# Patient Record
Sex: FEMALE | Race: WHITE | NOT HISPANIC OR LATINO | Employment: STUDENT | URBAN - METROPOLITAN AREA
[De-identification: names, ages, dates, MRNs, and addresses within clinical notes are randomized per-mention and may not be internally consistent; named-entity substitution may affect disease eponyms.]

---

## 2017-06-28 ENCOUNTER — PATIENT MESSAGE (OUTPATIENT)
Dept: PSYCHIATRY | Facility: CLINIC | Age: 22
End: 2017-06-28

## 2019-05-27 ENCOUNTER — TELEPHONE (OUTPATIENT)
Dept: SPINE | Facility: CLINIC | Age: 24
End: 2019-05-27

## 2019-05-27 NOTE — TELEPHONE ENCOUNTER
Spoke with patient and scheduled her an appointment to see Sharmin Harris 5-29-19, she indicated understanding.

## 2019-05-27 NOTE — TELEPHONE ENCOUNTER
----- Message from Alissa Conte sent at 5/27/2019  1:23 PM CDT -----   Pt  Is calling to  Reschedule  Harley , pt   Was offed  June 14 and stated the  Office stated they  Have  Sooner // please call [phone]

## 2019-05-29 ENCOUNTER — OFFICE VISIT (OUTPATIENT)
Dept: SPINE | Facility: CLINIC | Age: 24
End: 2019-05-29
Payer: COMMERCIAL

## 2019-05-29 VITALS
BODY MASS INDEX: 26.71 KG/M2 | SYSTOLIC BLOOD PRESSURE: 118 MMHG | HEART RATE: 110 BPM | DIASTOLIC BLOOD PRESSURE: 77 MMHG | HEIGHT: 64 IN | WEIGHT: 156.44 LBS

## 2019-05-29 DIAGNOSIS — M54.2 CERVICALGIA: Primary | ICD-10-CM

## 2019-05-29 DIAGNOSIS — G89.29 CHRONIC MIDLINE THORACIC BACK PAIN: ICD-10-CM

## 2019-05-29 DIAGNOSIS — G89.29 CHRONIC MIDLINE LOW BACK PAIN WITHOUT SCIATICA: ICD-10-CM

## 2019-05-29 DIAGNOSIS — M54.6 CHRONIC MIDLINE THORACIC BACK PAIN: ICD-10-CM

## 2019-05-29 DIAGNOSIS — M54.50 CHRONIC MIDLINE LOW BACK PAIN WITHOUT SCIATICA: ICD-10-CM

## 2019-05-29 DIAGNOSIS — Z87.39 HISTORY OF SCOLIOSIS: ICD-10-CM

## 2019-05-29 PROCEDURE — 99999 PR PBB SHADOW E&M-EST. PATIENT-LVL III: ICD-10-PCS | Mod: PBBFAC,,, | Performed by: PHYSICIAN ASSISTANT

## 2019-05-29 PROCEDURE — 99203 OFFICE O/P NEW LOW 30 MIN: CPT | Mod: S$GLB,,, | Performed by: PHYSICIAN ASSISTANT

## 2019-05-29 PROCEDURE — 99203 PR OFFICE/OUTPT VISIT, NEW, LEVL III, 30-44 MIN: ICD-10-PCS | Mod: S$GLB,,, | Performed by: PHYSICIAN ASSISTANT

## 2019-05-29 PROCEDURE — 99999 PR PBB SHADOW E&M-EST. PATIENT-LVL III: CPT | Mod: PBBFAC,,, | Performed by: PHYSICIAN ASSISTANT

## 2019-05-29 NOTE — PROGRESS NOTES
Neurosurgery History & Physical    Patient ID: Mirella Kline is a 23 y.o. female.    Chief Complaint   Patient presents with    Back Pain     She has had back and neck pain for years. Pain is constant and radiates down between her shoulder blades. She has scoliosis. Cracking her back makes the pain better. Sitting, standing, or lying down makes the pain worse. The pain is worse at night.    Neck Pain       Review of Systems   Constitutional: Negative for activity change, appetite change, chills, fever and unexpected weight change.   HENT: Negative for tinnitus, trouble swallowing and voice change.    Respiratory: Negative for apnea, cough, chest tightness and shortness of breath.    Cardiovascular: Negative for chest pain and palpitations.   Gastrointestinal: Negative for constipation, diarrhea, nausea and vomiting.   Genitourinary: Negative for difficulty urinating, dysuria, frequency and urgency.   Musculoskeletal: Positive for back pain, myalgias and neck pain. Negative for gait problem and neck stiffness.   Skin: Negative for wound.   Neurological: Negative for dizziness, tremors, seizures, facial asymmetry, speech difficulty, weakness, light-headedness, numbness and headaches.   Psychiatric/Behavioral: Negative for confusion and decreased concentration.       Past Medical History:   Diagnosis Date    History of psychiatric care     History of psychiatric hospitalization     Self-harming behavior     Suicide attempt      Social History     Socioeconomic History    Marital status: Single     Spouse name: Not on file    Number of children: Not on file    Years of education: Not on file    Highest education level: Not on file   Occupational History    Not on file   Social Needs    Financial resource strain: Not on file    Food insecurity:     Worry: Not on file     Inability: Not on file    Transportation needs:     Medical: Not on file     Non-medical: Not on file   Tobacco Use    Smoking  status: Current Some Day Smoker   Substance and Sexual Activity    Alcohol use: Yes    Drug use: Not on file    Sexual activity: Not on file   Lifestyle    Physical activity:     Days per week: Not on file     Minutes per session: Not on file    Stress: Not on file   Relationships    Social connections:     Talks on phone: Not on file     Gets together: Not on file     Attends Church service: Not on file     Active member of club or organization: Not on file     Attends meetings of clubs or organizations: Not on file     Relationship status: Not on file   Other Topics Concern    Caffeine Use: Excessive Not Asked    Financial Status: Unemployed Not Asked    Legal: Other Not Asked    Childhood History: Adopted Not Asked    Financial Status: Other Not Asked    Leisure: Exercise Not Asked    Childhood History: Early trauma Not Asked    Firearms: Does patient have access to a firearm? Not Asked    Leisure: Fishing Not Asked    Childhood History: Raised by parents Yes    Home situation: homeless Not Asked    Leisure: Hunting Not Asked    Childhood History: Uneventful Not Asked    Home situation: lives alone Not Asked    Leisure: Movie Watching Not Asked    Childhood History: Other Not Asked    Home situation: lives in group home Not Asked    Leisure: Shopping Not Asked    Education: Unfinished High School Not Asked    Home situation: lives in nursing home Not Asked    Leisure: Sports Not Asked    Education: High School Graduate Yes    Home situation: lives in shelter Not Asked    Leisure: Time with family Not Asked    Education: Unfinished college Not Asked    Home situation: lives with family Not Asked     Service Not Asked    Education: Trade School Not Asked    Home situation: lives with friends Not Asked    Spirituality: Active Participation Not Asked    Education: Associate's Degree Not Asked    Home situation: lives with significant other Not Asked    Spirituality:  "Organized Sabianism Not Asked    Education: Bachelor's Degree Not Asked    Home situation: lives with spouse Not Asked    Spirituality: Private Participation Not Asked    Education: More than one Bachelor's or Professional Not Asked    Legal consequences of chemical use Not Asked    Patient feels they ought to cut down on drinking/drug use Not Asked    Education: Master's, PhD Not Asked    Legal: Arrest history Not Asked    Patient annoyed by others criticizing their drinking/drug use Not Asked    Financial Status: Disabled Not Asked    Legal: Involved in civil litigation Not Asked    Patient has felt bad or guilty about drinking/drug use Not Asked    Financial Status: Employed Not Asked    Legal: Involved in criminal litigation Not Asked    Patient has had a drink/used drugs as an eye opener in the AM Not Asked   Social History Narrative    Not on file     Family History   Problem Relation Age of Onset    Bipolar disorder Neg Hx     Schizophrenia Neg Hx      Review of patient's allergies indicates:  No Known Allergies    Current Outpatient Medications:     aripiprazole (ABILIFY) 5 MG Tab, 1.5 pills by mouth daily, Disp: 45 tablet, Rfl: 1    lamotrigine (LAMICTAL) 200 MG tablet, Take 1 tablet (200 mg total) by mouth once daily., Disp: 30 tablet, Rfl: 2    lorazepam (ATIVAN) 0.5 MG tablet, Take 1 tablet (0.5 mg total) by mouth every 6 (six) hours as needed for Anxiety (or severe agitation)., Disp: 20 tablet, Rfl: 1    Vitals:    05/29/19 1322   BP: 118/77   BP Location: Right arm   Patient Position: Sitting   BP Method: Medium (Automatic)   Pulse: 110   Weight: 70.9 kg (156 lb 6.7 oz)   Height: 5' 4" (1.626 m)       Physical Exam   Constitutional: She is oriented to person, place, and time. She appears well-developed and well-nourished.   HENT:   Head: Normocephalic and atraumatic.   Eyes: Pupils are equal, round, and reactive to light.   Neck: Normal range of motion. Neck supple. "   Cardiovascular: Normal rate.   Pulmonary/Chest: Effort normal.   Musculoskeletal: Normal range of motion. She exhibits no edema.   Neurological: She is alert and oriented to person, place, and time. She has a normal Finger-Nose-Finger Test, a normal Heel to Shin Test, a normal Romberg Test and a normal Tandem Gait Test. Gait normal.   Reflex Scores:       Tricep reflexes are 2+ on the right side and 2+ on the left side.       Bicep reflexes are 2+ on the right side and 2+ on the left side.       Brachioradialis reflexes are 2+ on the right side and 2+ on the left side.       Patellar reflexes are 2+ on the right side and 2+ on the left side.       Achilles reflexes are 2+ on the right side and 2+ on the left side.  Skin: Skin is warm, dry and intact.   Psychiatric: She has a normal mood and affect. Her speech is normal and behavior is normal. Judgment and thought content normal.   Nursing note and vitals reviewed.      Neurologic Exam     Mental Status   Oriented to person, place, and time.   Oriented to person.   Oriented to place.   Oriented to time.   Follows 3 step commands.   Attention: normal. Concentration: normal.   Speech: speech is normal   Level of consciousness: alert  Knowledge: consistent with education.   Able to name object. Able to read. Able to repeat. Able to write. Normal comprehension.     Cranial Nerves     CN II   Visual acuity: normal  Right visual field deficit: none  Left visual field deficit: none     CN III, IV, VI   Pupils are equal, round, and reactive to light.  Right pupil: Size: 3 mm. Shape: regular. Reactivity: brisk. Consensual response: intact.   Left pupil: Size: 3 mm. Shape: regular. Reactivity: brisk. Consensual response: intact.   CN III: no CN III palsy  CN VI: no CN VI palsy  Nystagmus: none   Diplopia: none  Ophthalmoparesis: none  Conjugate gaze: present    CN V   Right facial sensation deficit: none  Left facial sensation deficit: none    CN VII   Right facial weakness:  none  Left facial weakness: none    CN VIII   Hearing: intact    CN IX, X   CN IX normal.   CN X normal.     CN XI   Right sternocleidomastoid strength: normal  Left sternocleidomastoid strength: normal  Right trapezius strength: normal  Left trapezius strength: normal    CN XII   Fasciculations: absent  Tongue deviation: none    Motor Exam   Muscle bulk: normal  Overall muscle tone: normal  Right arm pronator drift: absent  Left arm pronator drift: absent    Strength   Right neck flexion: 5/5  Left neck flexion: 5/5  Right neck extension: 5/5  Left neck extension: 5/5  Right deltoid: 5/5  Left deltoid: 5/5  Right biceps: 5/5  Left biceps: 5/5  Right triceps: 5/5  Left triceps: 5/5  Right wrist flexion: 5/5  Left wrist flexion: 5/5  Right wrist extension: 5/5  Left wrist extension: 5/5  Right interossei: 5/5  Left interossei: 5/5  Right abdominals: 5/5  Left abdominals: 5/5  Right iliopsoas: 5/5  Left iliopsoas: 5/5  Right quadriceps: 5/5  Left quadriceps: 5/5  Right hamstrin/5  Left hamstrin/5  Right glutei: 5/5  Left glutei: 5/5  Right anterior tibial: 5/5  Left anterior tibial: 5/5  Right posterior tibial: 5/5  Left posterior tibial: 5/5  Right peroneal: 5/5  Left peroneal: 5/5  Right gastroc: 5/5  Left gastroc: 5/5    Sensory Exam   Right arm light touch: normal  Left arm light touch: normal  Right leg light touch: normal  Left leg light touch: normal  Right arm vibration: normal  Left arm vibration: normal  Right arm pinprick: normal  Left arm pinprick: normal    Gait, Coordination, and Reflexes     Gait  Gait: normal    Coordination   Romberg: negative  Finger to nose coordination: normal  Heel to shin coordination: normal  Tandem walking coordination: normal    Tremor   Resting tremor: absent  Intention tremor: absent  Action tremor: absent    Reflexes   Right brachioradialis: 2+  Left brachioradialis: 2+  Right biceps: 2+  Left biceps: 2+  Right triceps: 2+  Left triceps: 2+  Right patellar: 2+  Left  patellar: 2+  Right achilles: 2+  Left achilles: 2+  Right Farrell: absent  Left Farrell: absent  Right ankle clonus: absent  Left ankle clonus: absent      Provider dictation:  23 year old female smoker with history of scoliosis, anxiety, bipolar disorder and depression is self referred for evaluation of scapula, neck and back pain.  She has had pain for years focused in the interscapular region with radiation along the axis of the spine to the lower back.  She denies radicular leg pain, numbness and tinging.  Pain increases with sitting, standing and laying down.  She ha stried advil for pain control.  She has not had PT or YULIA.  NDI:  44%.  Oswestry score: 28%.  PHQ:  9.    She is neurologically intact with 2+ muscle stretch reflexes, 5/5 strength and no sensory deficits int he upper / lower extremities.  There is winging of the right scapula.  Pain improves with both flexixon and extension of the lower back.    She has not had any imaging.    In conclusion, Ms. Ceron is a 23 year old female with chronic axial mechanical back pain and hisotry of scoliosis.  She has no radiculopathy.  We have discussed the importance of posture and regular exercise.  I am referring her to PT for improvement of strength and flexibility.  I recommend scoliosis xrays and will call with results once completed.  Follow up in 8 weeks to monitor progress with PT.    Visit Diagnosis:  There are no diagnoses linked to this encounter.    Total time spent counseling greater than fifty percent of total visit time.  Counseling included discussion regarding imaging findings, diagnosis possibilities, treatment options, risks and benefits.   The patient had many questions regarding the options and long-term effects.

## 2019-06-07 ENCOUNTER — CLINICAL SUPPORT (OUTPATIENT)
Dept: REHABILITATION | Facility: HOSPITAL | Age: 24
End: 2019-06-07
Payer: COMMERCIAL

## 2019-06-07 DIAGNOSIS — G89.29 CHRONIC BILATERAL LOW BACK PAIN WITHOUT SCIATICA: ICD-10-CM

## 2019-06-07 DIAGNOSIS — M54.50 CHRONIC BILATERAL LOW BACK PAIN WITHOUT SCIATICA: ICD-10-CM

## 2019-06-07 DIAGNOSIS — M54.2 NECK PAIN: ICD-10-CM

## 2019-06-07 PROCEDURE — 97161 PT EVAL LOW COMPLEX 20 MIN: CPT | Mod: PO | Performed by: PHYSICAL THERAPIST

## 2019-06-07 NOTE — PATIENT INSTRUCTIONS
[email or web address].[email or web address]/wp-content/[email or web address]-posture-p.[email or web address]    Sleeping on Back        Place pillow under knees. A pillow with cervical support and a roll around waist are also helpful.      Copyright © I. All rights reserved.        Sleeping on Side        Place pillow between knees. Use cervical support under neck and a roll around waist as needed.      Copyright © I. All rights reserved.        Posture - Standing        Good posture is important. Avoid slouching and forward head thrust. Maintain curve in low back and align ears over shoul- ders, hips over ankles.      Copyright © [employer]. All rights reserved.        Posture - Sitting        Sit upright, head facing forward. Try using a roll to support lower back. Keep shoulders relaxed, and avoid rounded back. Keep hips level with knees. Avoid crossing legs for long periods.      Copyright © [employer]. All rights reserved.        Reading        When reading, hold material in tilted position and maintain good sitting posture.      Copyright © [employer]. All rights reserved.          Work Positioning        Position self close to work, whether standing or sitting. Avoid straining forward at neck or waist.      Copyright © [employer]. All rights reserved.       Work Height and Reach        Ideal work height is no more than 2 to 4 inches below elbow level when standing, and at elbow level when sitting. Reaching should be limited to arm's length, with elbows slightly bent.      Copyright © [employer]. All rights reserved.

## 2019-06-07 NOTE — PLAN OF CARE
[facility]  Physical Therapy Initial Evaluation    Name: Mirella Kline  Clinic Number: [id number]    Therapy Diagnosis:   Encounter Diagnoses   Name Primary?    Chronic bilateral low back pain without sciatica     Neck pain      Physician: Sharmin Harris PA-C    Physician Orders: PT Eval and Treat     Medical Diagnosis from Referral:   Cervicalgia   Chronic midline thoracic back pain   Chronic midline low back pain without sciatica   History of scoliosis     Evaluation Date: 6/7/2019  Authorization Period Expiration: 12/31/2019  Plan of Care Expiration: 08/02/2019  Visit # / Visits authorized: 1/ 20    Time In: 1100  Time Out: 1200  Total Billable Time: 60 minutes    Precautions: Standard    Subjective   Date of onset: 5/29/2019  History of current condition - Mirella reports: having neck and low back pain in seated position and sometimes in standing position. No falls noted. No radicular pain. Patient reported no numbness/tingling noted. No radicular pain.  Red Flags  Patient reports no history of trauma including MVA/fall; past or present cancer; fever, chills, night sweats; unexplained weight change in past 3 months; recent infection; ongoing/persistent night pain; change in bowel/bladder, and saddle anesthesia.      Past Medical History:   Diagnosis Date    History of psychiatric care     History of psychiatric hospitalization     Self-harming behavior     Suicide attempt      Mirella Kline  has no past surgical history on file.    Mirella has a current medication list which includes the following prescription(s): aripiprazole, lamotrigine, and lorazepam.    Review of patient's allergies indicates:  No Known Allergies     Imaging: none at this time.    Prior Therapy: none noted  Social History:  lives with their family  Occupation: working part time at [employer]. Will be starting college in the fall at [employer].  Prior Level of Function: independent  Current Level  of Function: modified independent, increase time noted in seated position  Recent or major surgery: none noted  Accidents: none noted    Pain:  Current 2/10, worst 5/10, best 0/10   Location: bilateral low back, mid back, lower cervical   Description: Aching, Dull and Variable  Aggravating Factors: Sitting, Standing and Getting out of bed/chair  Easing Factors: standing, extension     Night pain: none    Pts goals: Decrease pain to low back and neck. Work on posture.    Objective   Posture: FHP, rounded shoulders, slump posture as well, winging of the scapula.  Palpation: mild tenderness to bilateral lumbar paraspinals.    Sensation: Dermatomes:   Right Left Comment   C2/C3 (posterior head/neck) intact intact    C4 intact intact    C5 intact intact    C6 intact intact    C7 intact intact    C8 intact intact    T1 intact intact      DTR:   Right Left Comment   Biceps (C5-6) 2+ 2+    Triceps (C7) 2+ 2+                                                                                                                                                                                                                                    Cervical ROM: Inclinometer/Goniometer                            Pain/dysfunction/movement  Flexion 45 Can't touch sternum to chin. Non-uniform curvature   Extension 50 Non-uniform curvature. Not within 10 degrees of parallel of the horizontal line   Side-bending Right 38    Side-bending Left 45    Right rotation 61 Chin/nose not in line with mid-clavicle. +/- 80 degrees   Left rotation 68 Chin/nose not in line with mid-clavicle. +/- 80 degrees     OA -  AA -     Upper Extremity Strength  (R) UE  (L) UE    C4 Upper trap 5/5 C4 Upper trap 5/5   C5 Deltoid(90 ABD): 5/5 C5 Deltoid (90 ABD): 5/5   C6 Biceps/ECRB/L 5/5 C6 Biceps/ECRB/L 5/5   C7 triceps/FCR 4+/5 C7 triceps/FCR 5/5   C8 Abd.pollicis reese 5/5 C8 Abd. Pollicis reese. 5/5   T1 First Dorsal inter 5/5 T1 First Dorsal inter 5/5       Special  "Tests:  Distraction -   Compression -   Spurlings -   MVA/trauma  Sharp-Jacinta   Alar integrity -  -  -   VA test   -     Lateral Flexion Alar Ligament -   DNF test   -     UMN:   Farrell: -  Lhermitt: -   FRT (38-45 degrees norm) -      Upper Limb Neurodynamic testing:  ULTT: (Check Median). Good screen for radiculopathy -    Palpation: point tender to UT    Flexibility: pec minor    Sensation: Dermatomes     Right Left Comment   L2 (lateral thigh) intact intact    L3 (medial thigh) intact intact    L4 (medial calf) intact intact    L5 (lateral calf) intact intact    S1 (lateral foot) intact intact    S2 (gastro/HS) intact intact    Saddle (cauda equina) intact intact      Myotomes:   Right Left Comment   Hip flexion (L2-3): 4+/5 5/5    Knee extension (L3-4): 5/5 5/5    DF (L4-5): 5/5 5/5    Great Toe Ext (L5-S1):   5/5 5/5    Glut Medius (L5) 4-/5 4/5    Great Toe Flex/HS (S1-S2) 5/5 5/5      DTR:   Right Left Comment   Patellar (L3-4) 2+ 2+    Achilles (S1) 2+ 2+        Thoracic/Lumbar AROM:     % limitation Pain/Dysfunction/movement   Flexion  (55-60 N)    25% Cannot touch toes  Non-uniform curvature     Extension (25 N)  25% UE does not maintain 170  ASIS does not clear toes  Lack of posterior weight shift     Special Tests:  -Repeated Flexion: no affect  -Repeated Ext: no affect    Static position:  Prolong seated position: low back pain noted    Bridge test/Endurance: (mean= 76.7 " compared to no low back pain, 172.9")     Lumbar:   SLR (with leg dominant pain)-  Slump-  Clonus-  Babinski-    UMN testing:  Cross over sign: -    GAIT: Mirella ambulates 100 feet with no assistive device with independently.     Transfers: independent    Pt/family was provided educational information, including: role of PT, goals for PT, scheduling - pt verbalized understanding. Discussed insurance limitations with pt.       CMS Impairment/Limitation/Restriction for FOTO Lumbar Spine Survey  Status Limitation G-Code CMS " Severity Modifier  Intake 67% 33% Current Status CJ - At least 20 percent but less than 40 percent  Predicted 74% 26% Goal Status+ CJ - At least 20 percent but less than 40 percent       TREATMENT   Treatment Time In: 1100  Treatment Time Out: 1200  Total Treatment time separate from Evaluation: 60 minutes    Mirella received therapeutic exercises to develop strength, endurance, ROM, flexibility, posture and core stabilization for 10 minutes including:  Standing scapular retraction with ER, yellow band  Cervical retraction  EIS  UT stretch    Home Exercises and Patient Education Provided    Education provided:   - Yes    Written Home Exercises Provided: yes.  Exercises were reviewed and Mirella was able to demonstrate them prior to the end of the session.  Mirella demonstrated good  understanding of the education provided.     See EMR under Patient Instructions for exercises provided 6/7/2019.    Assessment   Mirella is a 23 y.o. female referred to outpatient Physical Therapy with a medical diagnosis of Cervicalgia   Chronic midline thoracic back pain   Chronic midline low back pain without sciatica   History of scoliosis. Pt presents with postural imbalance, decreased lumbar and cervical mobility, neck pain, low back pain, decreased hip/core strength.    Problem List: pain, decreased ROM, decreased flexibility, decreased strength, decreased balance and stability, decreased motor control, antalgic gait and inability to participate fully in vocation pursuits.    Pt prognosis is Good.   Pt will benefit from skilled outpatient Physical Therapy to address the deficits stated above and in the chart below, provide pt/family education, and to maximize pt's level of independence.     Plan of care discussed with patient: Yes  Pt's spiritual, cultural and educational needs considered and patient is agreeable to the plan of care and goals as stated below:     Anticipated Barriers for therapy: none    Medical Necessity  is demonstrated by the following  History  Co-morbidities and personal factors that may impact the plan of care Co-morbidities:   anxiety    Personal Factors:   no deficits     moderate   Examination  Body Structures and Functions, activity limitations and participation restrictions that may impact the plan of care Body Regions:   neck  back  lower extremities  upper extremities  trunk    Body Systems:    gross symmetry  ROM  strength  balance  gait  transfers  transitions  motor control    Participation Restrictions:   Community ambulation  Seated work    Activity limitations:   Learning and applying knowledge  no deficits    General Tasks and Commands  no deficits    Communication  no deficits    Mobility  lifting and carrying objects  walking    Self care  no deficits    Domestic Life  doing house work (cleaning house, washing dishes, laundry)    Interactions/Relationships  no deficits    Life Areas  no deficits    Community and Social Life  no deficits         high   Clinical Presentation stable and uncomplicated low   Decision Making/ Complexity Score: low     Short Term GOALS:  In 4 weeks, pt. will:  - improve hip/core MMT 1/2 grade for ADL purposes.  - decrease outcome measure limitation to <30%  - Pt will demonstrate increased lumbar ROM by 25% from the initial ROM value with improvements noted in functional ROM and ability to perform ADLs.  - Patient report a reduction in worst pain score by 1-2 points for improved tolerance for seated work..  - demonstrate cervical rotation > 65 degrees for driving/mobility purposes.    Long Term GOALS:  In 8 weeks, pt. will:  - be independent and compliant with HEP and SX management   - decrease outcome measure limitation to <20%  Pt to demonstrate ability to independently control and reduce their pain through posture positioning and mechanical movements throughout a typical day.      Plan   Plan of care Certification: 6/7/2019 to 08/02/2019.  Outpatient Physical Therapy  2 times weekly for 8 weeks to include the following interventions: Manual Therapy, Moist Heat/ Ice, Neuromuscular Re-ed, Patient Education, Therapeutic Activites and Therapeutic Exercise.      Mirella may at times be seen by a PTA as part of the Rehab Team.    Randal Cisneros, PT

## 2020-10-05 PROBLEM — Z34.90 ENCOUNTER FOR INDUCTION OF LABOR: Status: ACTIVE | Noted: 2020-10-05

## 2020-10-05 PROBLEM — Z34.90 TERM PREGNANCY: Status: ACTIVE | Noted: 2020-10-05

## 2021-05-10 ENCOUNTER — PATIENT MESSAGE (OUTPATIENT)
Dept: RESEARCH | Facility: HOSPITAL | Age: 26
End: 2021-05-10

## 2021-06-13 ENCOUNTER — OFFICE VISIT (OUTPATIENT)
Dept: URGENT CARE | Facility: CLINIC | Age: 26
End: 2021-06-13
Payer: COMMERCIAL

## 2021-06-13 VITALS
RESPIRATION RATE: 16 BRPM | DIASTOLIC BLOOD PRESSURE: 79 MMHG | BODY MASS INDEX: 33.97 KG/M2 | HEIGHT: 64 IN | OXYGEN SATURATION: 98 % | TEMPERATURE: 97 F | HEART RATE: 86 BPM | SYSTOLIC BLOOD PRESSURE: 120 MMHG | WEIGHT: 199 LBS

## 2021-06-13 DIAGNOSIS — J06.9 VIRAL URI WITH COUGH: Primary | ICD-10-CM

## 2021-06-13 PROCEDURE — 99203 PR OFFICE/OUTPT VISIT, NEW, LEVL III, 30-44 MIN: ICD-10-PCS | Mod: S$GLB,,, | Performed by: PHYSICIAN ASSISTANT

## 2021-06-13 PROCEDURE — 99203 OFFICE O/P NEW LOW 30 MIN: CPT | Mod: S$GLB,,, | Performed by: PHYSICIAN ASSISTANT

## 2021-06-13 RX ORDER — AZELASTINE 1 MG/ML
1 SPRAY, METERED NASAL 2 TIMES DAILY
Qty: 30 ML | Refills: 0 | Status: SHIPPED | OUTPATIENT
Start: 2021-06-13 | End: 2021-09-01

## 2021-06-13 RX ORDER — BENZONATATE 200 MG/1
200 CAPSULE ORAL 3 TIMES DAILY PRN
Qty: 30 CAPSULE | Refills: 0 | Status: SHIPPED | OUTPATIENT
Start: 2021-06-13 | End: 2021-06-23

## 2021-09-01 ENCOUNTER — OFFICE VISIT (OUTPATIENT)
Dept: FAMILY MEDICINE | Facility: CLINIC | Age: 26
End: 2021-09-01
Payer: COMMERCIAL

## 2021-09-01 VITALS — BODY MASS INDEX: 25.61 KG/M2 | WEIGHT: 150 LBS | HEIGHT: 64 IN

## 2021-09-01 DIAGNOSIS — R05.9 COUGH: ICD-10-CM

## 2021-09-01 DIAGNOSIS — J02.9 SORE THROAT: ICD-10-CM

## 2021-09-01 DIAGNOSIS — H66.90 ACUTE OTITIS MEDIA, UNSPECIFIED OTITIS MEDIA TYPE: Primary | ICD-10-CM

## 2021-09-01 LAB
CTP QC/QA: YES
SARS-COV-2 RDRP RESP QL NAA+PROBE: NEGATIVE

## 2021-09-01 PROCEDURE — 99203 OFFICE O/P NEW LOW 30 MIN: CPT | Mod: 95,,, | Performed by: INTERNAL MEDICINE

## 2021-09-01 PROCEDURE — 99203 PR OFFICE/OUTPT VISIT, NEW, LEVL III, 30-44 MIN: ICD-10-PCS | Mod: 95,,, | Performed by: INTERNAL MEDICINE

## 2021-09-01 RX ORDER — AMOXICILLIN 500 MG/1
500 CAPSULE ORAL EVERY 12 HOURS
Qty: 20 CAPSULE | Refills: 0 | Status: SHIPPED | OUTPATIENT
Start: 2021-09-01 | End: 2021-09-11

## 2021-09-22 ENCOUNTER — OFFICE VISIT (OUTPATIENT)
Dept: FAMILY MEDICINE | Facility: CLINIC | Age: 26
End: 2021-09-22
Payer: COMMERCIAL

## 2021-09-22 VITALS — WEIGHT: 150 LBS | HEIGHT: 64 IN | BODY MASS INDEX: 25.61 KG/M2

## 2021-09-22 DIAGNOSIS — F31.9 BIPOLAR AFFECTIVE DISORDER, REMISSION STATUS UNSPECIFIED: Primary | ICD-10-CM

## 2021-09-22 DIAGNOSIS — F32.A DEPRESSION, UNSPECIFIED DEPRESSION TYPE: ICD-10-CM

## 2021-09-22 DIAGNOSIS — F41.9 ANXIETY: ICD-10-CM

## 2021-09-22 PROCEDURE — 99213 OFFICE O/P EST LOW 20 MIN: CPT | Mod: 95,,, | Performed by: INTERNAL MEDICINE

## 2021-09-22 PROCEDURE — 99213 PR OFFICE/OUTPT VISIT, EST, LEVL III, 20-29 MIN: ICD-10-PCS | Mod: 95,,, | Performed by: INTERNAL MEDICINE

## 2021-11-04 ENCOUNTER — OFFICE VISIT (OUTPATIENT)
Dept: PSYCHIATRY | Facility: CLINIC | Age: 26
End: 2021-11-04
Payer: COMMERCIAL

## 2021-11-04 VITALS
DIASTOLIC BLOOD PRESSURE: 76 MMHG | WEIGHT: 163.5 LBS | HEIGHT: 64 IN | SYSTOLIC BLOOD PRESSURE: 109 MMHG | BODY MASS INDEX: 27.91 KG/M2 | HEART RATE: 81 BPM

## 2021-11-04 DIAGNOSIS — F41.1 GAD (GENERALIZED ANXIETY DISORDER): Primary | ICD-10-CM

## 2021-11-04 DIAGNOSIS — F32.A DEPRESSION, UNSPECIFIED DEPRESSION TYPE: ICD-10-CM

## 2021-11-04 DIAGNOSIS — F31.9 BIPOLAR AFFECTIVE DISORDER, REMISSION STATUS UNSPECIFIED: ICD-10-CM

## 2021-11-04 DIAGNOSIS — F41.9 ANXIETY: ICD-10-CM

## 2021-11-04 PROCEDURE — 90792 PR PSYCHIATRIC DIAGNOSTIC EVALUATION W/MEDICAL SERVICES: ICD-10-PCS | Mod: S$GLB,,,

## 2021-11-04 PROCEDURE — 99999 PR PBB SHADOW E&M-EST. PATIENT-LVL III: CPT | Mod: PBBFAC,,,

## 2021-11-04 PROCEDURE — 90792 PSYCH DIAG EVAL W/MED SRVCS: CPT | Mod: S$GLB,,,

## 2021-11-04 PROCEDURE — 99999 PR PBB SHADOW E&M-EST. PATIENT-LVL III: ICD-10-PCS | Mod: PBBFAC,,,

## 2021-11-04 RX ORDER — LAMOTRIGINE 25 MG/1
TABLET ORAL
Qty: 46 TABLET | Refills: 0 | Status: SHIPPED | OUTPATIENT
Start: 2021-11-04 | End: 2021-12-02

## 2021-12-02 ENCOUNTER — OFFICE VISIT (OUTPATIENT)
Dept: PSYCHIATRY | Facility: CLINIC | Age: 26
End: 2021-12-02
Payer: COMMERCIAL

## 2021-12-02 VITALS
HEIGHT: 64 IN | WEIGHT: 165.88 LBS | SYSTOLIC BLOOD PRESSURE: 117 MMHG | HEART RATE: 90 BPM | DIASTOLIC BLOOD PRESSURE: 73 MMHG | BODY MASS INDEX: 28.32 KG/M2

## 2021-12-02 DIAGNOSIS — F41.1 GAD (GENERALIZED ANXIETY DISORDER): ICD-10-CM

## 2021-12-02 DIAGNOSIS — F33.41 RECURRENT MAJOR DEPRESSIVE DISORDER, IN PARTIAL REMISSION: Primary | ICD-10-CM

## 2021-12-02 PROCEDURE — 99214 OFFICE O/P EST MOD 30 MIN: CPT | Mod: S$GLB,,,

## 2021-12-02 PROCEDURE — 99214 PR OFFICE/OUTPT VISIT, EST, LEVL IV, 30-39 MIN: ICD-10-PCS | Mod: S$GLB,,,

## 2021-12-02 PROCEDURE — 99999 PR PBB SHADOW E&M-EST. PATIENT-LVL III: CPT | Mod: PBBFAC,,,

## 2021-12-02 PROCEDURE — 90833 PSYTX W PT W E/M 30 MIN: CPT | Mod: S$GLB,,,

## 2021-12-02 PROCEDURE — 90833 PR PSYCHOTHERAPY W/PATIENT W/E&M, 30 MIN (ADD ON): ICD-10-PCS | Mod: S$GLB,,,

## 2021-12-02 PROCEDURE — 99999 PR PBB SHADOW E&M-EST. PATIENT-LVL III: ICD-10-PCS | Mod: PBBFAC,,,

## 2021-12-02 RX ORDER — LAMOTRIGINE 100 MG/1
100 TABLET ORAL DAILY
Qty: 30 TABLET | Refills: 0 | Status: SHIPPED | OUTPATIENT
Start: 2021-12-02 | End: 2021-12-30

## 2021-12-30 ENCOUNTER — OFFICE VISIT (OUTPATIENT)
Dept: PSYCHIATRY | Facility: CLINIC | Age: 26
End: 2021-12-30
Payer: COMMERCIAL

## 2021-12-30 DIAGNOSIS — F33.41 RECURRENT MAJOR DEPRESSIVE DISORDER, IN PARTIAL REMISSION: Primary | ICD-10-CM

## 2021-12-30 DIAGNOSIS — F41.9 ANXIETY DISORDER, UNSPECIFIED TYPE: ICD-10-CM

## 2021-12-30 PROCEDURE — 90833 PSYTX W PT W E/M 30 MIN: CPT | Mod: 95,,,

## 2021-12-30 PROCEDURE — 1160F PR REVIEW ALL MEDS BY PRESCRIBER/CLIN PHARMACIST DOCUMENTED: ICD-10-PCS | Mod: CPTII,95,,

## 2021-12-30 PROCEDURE — 1159F PR MEDICATION LIST DOCUMENTED IN MEDICAL RECORD: ICD-10-PCS | Mod: CPTII,95,,

## 2021-12-30 PROCEDURE — 99214 PR OFFICE/OUTPT VISIT, EST, LEVL IV, 30-39 MIN: ICD-10-PCS | Mod: 95,,,

## 2021-12-30 PROCEDURE — 99214 OFFICE O/P EST MOD 30 MIN: CPT | Mod: 95,,,

## 2021-12-30 PROCEDURE — 90833 PR PSYCHOTHERAPY W/PATIENT W/E&M, 30 MIN (ADD ON): ICD-10-PCS | Mod: 95,,,

## 2021-12-30 PROCEDURE — 1160F RVW MEDS BY RX/DR IN RCRD: CPT | Mod: CPTII,95,,

## 2021-12-30 PROCEDURE — 1159F MED LIST DOCD IN RCRD: CPT | Mod: CPTII,95,,

## 2021-12-30 RX ORDER — BUPROPION HYDROCHLORIDE 150 MG/1
150 TABLET ORAL DAILY
Qty: 30 TABLET | Refills: 11 | Status: SHIPPED | OUTPATIENT
Start: 2021-12-30 | End: 2022-01-31

## 2021-12-30 RX ORDER — LAMOTRIGINE 200 MG/1
200 TABLET ORAL DAILY
Qty: 30 TABLET | Refills: 0 | Status: SHIPPED | OUTPATIENT
Start: 2021-12-30 | End: 2022-01-31

## 2021-12-30 RX ORDER — HYDROXYZINE HYDROCHLORIDE 25 MG/1
25 TABLET, FILM COATED ORAL 3 TIMES DAILY
Qty: 90 TABLET | Refills: 0 | Status: SHIPPED | OUTPATIENT
Start: 2021-12-30 | End: 2022-01-29

## 2022-01-31 ENCOUNTER — OFFICE VISIT (OUTPATIENT)
Dept: PSYCHIATRY | Facility: CLINIC | Age: 27
End: 2022-01-31
Payer: COMMERCIAL

## 2022-01-31 DIAGNOSIS — F33.41 RECURRENT MAJOR DEPRESSIVE DISORDER, IN PARTIAL REMISSION: ICD-10-CM

## 2022-01-31 DIAGNOSIS — F51.04 PSYCHOPHYSIOLOGICAL INSOMNIA: ICD-10-CM

## 2022-01-31 DIAGNOSIS — F41.1 GAD (GENERALIZED ANXIETY DISORDER): Primary | ICD-10-CM

## 2022-01-31 PROCEDURE — 1159F MED LIST DOCD IN RCRD: CPT | Mod: CPTII,95,,

## 2022-01-31 PROCEDURE — 99214 PR OFFICE/OUTPT VISIT, EST, LEVL IV, 30-39 MIN: ICD-10-PCS | Mod: 95,,,

## 2022-01-31 PROCEDURE — 1160F PR REVIEW ALL MEDS BY PRESCRIBER/CLIN PHARMACIST DOCUMENTED: ICD-10-PCS | Mod: CPTII,95,,

## 2022-01-31 PROCEDURE — 1159F PR MEDICATION LIST DOCUMENTED IN MEDICAL RECORD: ICD-10-PCS | Mod: CPTII,95,,

## 2022-01-31 PROCEDURE — 99214 OFFICE O/P EST MOD 30 MIN: CPT | Mod: 95,,,

## 2022-01-31 PROCEDURE — 1160F RVW MEDS BY RX/DR IN RCRD: CPT | Mod: CPTII,95,,

## 2022-01-31 RX ORDER — TRAZODONE HYDROCHLORIDE 50 MG/1
50-100 TABLET ORAL NIGHTLY
Qty: 60 TABLET | Refills: 0 | Status: SHIPPED | OUTPATIENT
Start: 2022-01-31 | End: 2022-03-02 | Stop reason: SDUPTHER

## 2022-01-31 RX ORDER — LAMOTRIGINE 100 MG/1
200 TABLET ORAL DAILY
Qty: 60 TABLET | Refills: 0 | Status: SHIPPED | OUTPATIENT
Start: 2022-01-31 | End: 2022-03-02 | Stop reason: SDUPTHER

## 2022-01-31 RX ORDER — BUPROPION HYDROCHLORIDE 300 MG/1
300 TABLET ORAL DAILY
Qty: 30 TABLET | Refills: 0 | Status: SHIPPED | OUTPATIENT
Start: 2022-01-31 | End: 2022-03-02 | Stop reason: SDUPTHER

## 2022-01-31 NOTE — PROGRESS NOTES
" Outpatient Psychiatry Follow-Up Visit (MD/NP)    1/31/2022    Clinical Status of Patient:  Outpatient (Virtual)  The patient location is:  [address] [place] [id number]  The patient location [place] is: [place]  The patient phone number is: [phone] (Mobile)  Visit type: Virtual visit with synchronous audio and video  Each patient to whom he or she provides medical services by telemedicine is:  (1) informed of the relationship between the physician and patient and the respective role of any other health care provider with respect to management of the patient; and (2) notified that he or she may decline to receive medical services by telemedicine and may withdraw from such care at any time.      Chief Complaint:  Mirella Kline is a 26 y.o. female who presents today for follow-up of depression and anxiety.  Met with patient.      Interval History and Content of Current Session:  Interim Events/Subjective Report/Content of Current Session:   Pt is a 26 y.o. female with past history of  who presents for follow up treatment. Pt established care with me on 11/4/21, where Pt met criteria for MDD and TAZ. Pt is currently taking Lamictal 200 mg po daily, Wellbutrin  mg po q.a.m., and hydroxyzine 50 mg po TID PRN anxiety.  Meds tolerated well.    Today, Pt reports a marked decrease in irritability and anxiety since last visit on 12/30. Pt states, "I'm doing really good overall." She reports her mood is stable and denies crying spells. Pt endorses some increase in energy after starting Wellbutrin  mg po q.a.m., but reports continued fatigue and asks for ways to increase energy level. Discussed importance of daily exercise and diet incorporating whole foods for energy and mental wellbeing. Pt notes she has started practicing yoga this month and states, "I've been really productive this month." She denies hypomanic symptoms.    She does report continued difficulty sleeping, with trouble both " "initiating and maintaining sleep. She reports she has been going to bed around 0300 and waking up around 0700. She denies depressed mood, anhedonia, changes in appetite, or thoughts of self harm or SI/plan/intent.      Depressive Disorder: DENIES depressed mood, irritable mood, appetite/weight change, sleep change, tired/fatigued, psychomotor change, worthlessness, guilt, hopelessness, somatic symptoms, passive suicidal ideation, active suicidal ideation.  REPORTS sleep change, tired/fatigued, concentration problems.   Anxiety Disorder: none.   Manic Disorder: DENIES expansive mood, grandiose, hyperverbal, mixed with depression symptoms, reckless behavior., REPORTS irritable mood, increased distractability, decreased need for sleep, racing thoughts.   Psychotic Disorder: DENIES all.   Substance Use:  DENIES all.     Past psych hx:   She reports she has struggled with depression and anxiety for "years." She was seen by child psychiatrist aDriusz Jimenez MD from [id number] during which time she was initially diagnosed with bipolar disorder, but she reports this diagnosis was later changed to mood disorder. She denies ever having symptoms of shan including grandiosity, decreased need for sleep, excessive talking, distractibility, increase in goal-directed activity, or recklessness. She reports that her mood is depressed at baseline and she occasionally has periods where it is lower than normal. She reports a period of approximately 2 months after the birth of her son in October of 2020 during which she experienced markedly depressed mood and anxiety. She did not seek treatment at that time; she has not sought treatment since terminating with Dr. Jimenez in 2014. She is not currently taking any medications. At this time she reports symptoms of depressed mood, anhedonia, fatigue, feelings of guilt and worthlessness, fatigue, and concentration problems. She also reports excessive anxiety and worry which she has " "difficulty controlling, restlessness, irritability, and headaches, as well as anxiety around groups of people, including classes at school and the grocery store, and dwelling on conversations she has had. She also reports frequent negative thinking and worst case scenario thinking. These symptoms have been consistent for "years." She reports she is under a great deal of stress related to her relationship with her son's father. She further states she would like pharmacological treatment for her depressed mood and anxiety. She states she does not wish to take SSRI's as she did not like the way they made her feel. She states she has done well on Lamictal in the past and would like to try Lamictal again.       Interim hx:  Medication changes last visit:   · Increase Lamictal to 200 mg po daily to target irritability and concentration deficit  · Start Wellbutrin  mg q.a.m. to target anxiety and irritability  · Start hydroxyzine 25 mg po TID PRN anxiety    Anxiety: decreased  Depression: decreased  Irritability: decreased     Denies suicidal/homicidal ideations.  Denies hopelessness/worthlessness.    Denies auditory/visual hallucinations      Alcohol/Drugs/Caffeine/Tobacco: No change in consumption  Substance Hx:  Caffeine: Coffee 3-4 cups per coffee/day if she is at work or school  Tobacco: prior smoker, quit in Jan 2020  Alcohol: occasional, approximately once every few months, 2-3 drinks per occasion  Drug use: no  Rehab: no  Prior/current AA: no    Review of Systems   · PSYCHIATRIC: Pertinant items are noted in the narrative.  · All other systems reviewed and found to be negative    Past Medical, Family and Social History: The patient's past medical, family and social history have been reviewed and updated as appropriate within the electronic medical record - see encounter notes.    Medications: Lamictal 200 mg po daily, Wellbutrin  mg q.a.m., hydroxyzine 25 mg po TID PRN anxiety    Compliance: yes    Side " effects: None    Risk Parameters:  Patient reports no suicidal ideation  Patient reports no homicidal ideation  Patient reports no self-injurious behavior  Patient reports no violent behavior    Exam   Constitutional  Vitals:  Most recent vital signs, dated less than 90 days prior to this appointment, were reviewed.   Last 3 sets of Vitals    Vitals - 1 value per visit 11/4/2021 12/2/2021 12/2/2021   SYSTOLIC 109 - 117   DIASTOLIC 76 - 73   Pulse 81 - 90   Temp - - -   Resp - - -   SPO2 - - -   Weight (lb) 163.47 - 165.9   Weight (kg) 74.15 - 75.25   Height 64 - 64   BMI (Calculated) 28 - 28.5   VISIT REPORT - - -   Pain Score  - 0 -          General:  unremarkable, age appropriate, normal weight, well nourished, casually dressed     Musculoskeletal  Muscle Strength/Tone:  no rigidity, no dystonia, no tremor, no tic   Gait & Station:  non-ataxic     Psychiatric  Speech:  no latency; no press   Mood & Affect:  happy  congruent and appropriate   Thought Process:  normal and logical   Associations:  intact   Thought Content:  normal, no suicidality, no homicidality, delusions, or paranoia   Insight:  intact   Judgement: behavior is adequate to circumstances   Orientation:  grossly intact   Memory: intact for content of interview   Language: grossly intact   Attention Span & Concentration:  able to focus   Fund of Knowledge:  intact and appropriate to age and level of education     Assessment and Diagnosis   Status/Progress: Based on the examination today, the patient's problem(s) is/are improved.  New problems have not been presented today.   Co-morbidities are not complicating management of the primary condition.  The working differential for this patient includes bipolar disorder, mood disorder NOS, social anxiety, intermittent explosive disorder.     General Impression: Pt is a 26 y.o. female with past history of anxiety and depression presenting today for a follow up visit with marked decrease in irritability. She  denies racing thoughts, increased distractibility, increase in goal directed activity. She denies elevated or expansive mood, inflated self-esteem or grandiosity, flight of ideas, or excessive involvement in activities that have a high potential for painful consequences.     Per shared decision making with the Pt Lamictal will be maintained at 200 mg po daily to target residual symptoms of depression including concentration problems, and fatigue Pt requests information on ways to increase her energy level and would like to increase Wellbutrin. Discussed R/B/SE with Pt, including but not limited to GI side effects, sexual dysfunction, mood destabilization, headaches. Pt verbalized understanding and states she would like to increase the dose of this medication.      Safe for outpatient follow up and no acute safety concerns.    Encounter Diagnoses   Name Primary?    Recurrent major depressive disorder, in partial remission     TAZ (generalized anxiety disorder) Yes         Intervention/Counseling/Treatment Plan   · Medication Management: The risks and benefits of medication were discussed with the patient. Shared decision making occurred   · The treatment plan and follow up plan were reviewed with the patient.   · ContinueLamictal to 200 mg po daily for mood  · Increase Wellbutrin  mg q.a.m. to 300 mg po q.a.m.  to target anxiety and mood  · Continue hydroxyzine 25 mg po TID PRN anxiety  · Start trazodone  mg po q.h.s. PRN insomnia  · Offered referral for individual psychotherapy   · Call to report any worsening of symptoms or problems with the medication. Pt instructed to go to ER with thoughts of harming self, others  · Labs: no new orders      Psychotherapy:    Target symptoms: depression, anxiety    Outcome monitoring methods: self-report, observation   Therapeutic Intervention Type: supportive psychotherapy   Why chosen therapy is appropriate versus another modality: relevant to diagnosis,  patient responds to this modality   Patient's response to intervention:The patient's response to intervention is accepting.   Progress toward goals: The patient's progress toward goals is good.   Topics discussed/themes: building skills sets for symptom management, symptom recognition, nutrition, exercise   Duration of intervention: 16 minutes    Return to Clinic: 1 month    -Pt given contact number for psychotherapists at [facility] ans also instructed they may check with insurance for a list of providers  -Spent 30min face to face with the pt; >50% time spent in counseling   -Supportive therapy and psychoeducation provided  -R/B/SE's of medications discussed with the pt who expresses understanding and chooses to take medications as prescribed.   -Pt instructed to call clinic, 911 or go to nearest emergency room if sxs worsen or pt is in   crisis. The pt expresses understanding.    Kristy Alvarez

## 2022-03-02 ENCOUNTER — OFFICE VISIT (OUTPATIENT)
Dept: PSYCHIATRY | Facility: CLINIC | Age: 27
End: 2022-03-02
Payer: COMMERCIAL

## 2022-03-02 DIAGNOSIS — F33.41 RECURRENT MAJOR DEPRESSIVE DISORDER, IN PARTIAL REMISSION: ICD-10-CM

## 2022-03-02 DIAGNOSIS — F51.04 PSYCHOPHYSIOLOGICAL INSOMNIA: ICD-10-CM

## 2022-03-02 PROCEDURE — 99214 PR OFFICE/OUTPT VISIT, EST, LEVL IV, 30-39 MIN: ICD-10-PCS | Mod: 95,,,

## 2022-03-02 PROCEDURE — 99214 OFFICE O/P EST MOD 30 MIN: CPT | Mod: 95,,,

## 2022-03-02 PROCEDURE — 1160F PR REVIEW ALL MEDS BY PRESCRIBER/CLIN PHARMACIST DOCUMENTED: ICD-10-PCS | Mod: CPTII,95,,

## 2022-03-02 PROCEDURE — 1159F PR MEDICATION LIST DOCUMENTED IN MEDICAL RECORD: ICD-10-PCS | Mod: CPTII,95,,

## 2022-03-02 PROCEDURE — 1160F RVW MEDS BY RX/DR IN RCRD: CPT | Mod: CPTII,95,,

## 2022-03-02 PROCEDURE — 1159F MED LIST DOCD IN RCRD: CPT | Mod: CPTII,95,,

## 2022-03-02 PROCEDURE — 90833 PSYTX W PT W E/M 30 MIN: CPT | Mod: 95,,,

## 2022-03-02 PROCEDURE — 90833 PR PSYCHOTHERAPY W/PATIENT W/E&M, 30 MIN (ADD ON): ICD-10-PCS | Mod: 95,,,

## 2022-03-02 RX ORDER — LAMOTRIGINE 100 MG/1
200 TABLET ORAL DAILY
Qty: 60 TABLET | Refills: 0 | Status: SHIPPED | OUTPATIENT
Start: 2022-03-02 | End: 2022-09-26

## 2022-03-02 RX ORDER — TRAZODONE HYDROCHLORIDE 50 MG/1
50-100 TABLET ORAL NIGHTLY
Qty: 60 TABLET | Refills: 0 | Status: SHIPPED | OUTPATIENT
Start: 2022-03-02 | End: 2022-10-24

## 2022-03-02 RX ORDER — BUPROPION HYDROCHLORIDE 300 MG/1
300 TABLET ORAL DAILY
Qty: 30 TABLET | Refills: 0 | Status: SHIPPED | OUTPATIENT
Start: 2022-03-02 | End: 2022-09-26

## 2022-03-02 NOTE — PROGRESS NOTES
" Outpatient Psychiatry Follow-Up Visit (MD/NP)    3/2/2022    Clinical Status of Patient:  Outpatient (Virtual)  The patient location is:  [address] [place] [id number]  The patient location [place] is: [place]  The patient phone number is: [phone] (Mobile)  Visit type: Virtual visit with synchronous audio and video  Each patient to whom he or she provides medical services by telemedicine is:  (1) informed of the relationship between the physician and patient and the respective role of any other health care provider with respect to management of the patient; and (2) notified that he or she may decline to receive medical services by telemedicine and may withdraw from such care at any time.      Chief Complaint:  Mirella Kline is a 26 y.o. female who presents today for follow-up of depression and anxiety.  Met with patient.      Interval History and Content of Current Session:  Interim Events/Subjective Report/Content of Current Session:   Pt is a 26 y.o. female with past history of anxiety and mood disorder  who presents for follow up treatment. Pt established care with me on 11/4/21, where Pt met criteria for MDD and TAZ. Pt is currently taking Lamictal 200 mg po daily, Wellbutrin  mg po q.a.m., trazodone  mg po q.h.s. PRN insomnia, and hydroxyzine 25 mg po TID PRN anxiety.  Meds tolerated well and provide good relief of symptoms overall.    Today patient reports her mood is "good."  She denies depressed mood, crying spells, anxiety, or irritability.  And states I am doing really good overall.  She notes improvement in quantity and quality of sleep after starting trazodone.  She notes that although her mood has improved she still has my depressive habits reports that she has to put effort into getting up in the morning and taking care of herself though she reiterates that she is no longer having feelings of sadness.  She states she has  a lot going on with school" but needs to " "grow out of my depressive habits.  Encouraged individual psychotherapy which patient verbalizes interest in.      Depressive Disorder: DENIES depressed mood, irritable mood, appetite/weight change, sleep change, tired/fatigued, psychomotor change, worthlessness, guilt, hopelessness, somatic symptoms, passive suicidal ideation, active suicidal ideation.  REPORTS concentration problems.   Anxiety Disorder: none.   Manic Disorder: DENIES expansive mood, irritable mood, grandiose, increased distractability, decreased need for sleep, hyperverbal, racing thoughts, mixed with depression symptoms, reckless behavior, agitation., REPORTS none.   Psychotic Disorder: DENIES all.   Substance Use:  DENIES all.     Initial HPI: PtCriss is a 26 y.o. female, with a past psychiatric hx of anxiety and mood disorder presenting to the clinic for an initial evaluation and treatment. Past medical history detailed below. She reports she has struggled with depression and anxiety for "years." She was seen by child psychiatrist Dariusz Jimenez MD from [id number] during which time she was initially diagnosed with bipolar disorder, but she reports this diagnosis was later changed to mood disorder. She denies ever having symptoms of shan including grandiosity, decreased need for sleep, excessive talking, distractibility, increase in goal-directed activity, or recklessness. She reports that her mood is depressed at baseline and she occasionally has periods where it is lower than normal. She reports a period of approximately 2 months after the birth of her son in October of 2020 during which she experienced markedly depressed mood and anxiety. She did not seek treatment at that time; she has not sought treatment since terminating with Dr. Jimenez in 2014. She is not currently taking any medications. At this time she reports symptoms of depressed mood, anhedonia, fatigue, feelings of guilt and worthlessness, fatigue, and concentration problems. She " "also reports excessive anxiety and worry which she has difficulty controlling, restlessness, irritability, and headaches, as well as anxiety around groups of people, including classes at school and the grocery store, and dwelling on conversations she has had. She also reports frequent negative thinking and worst case scenario thinking. These symptoms have been consistent for "years." She reports she is under a great deal of stress related to her relationship with her son's father. She further states she would like pharmacological treatment for her depressed mood and anxiety. She states she does not wish to take SSRI's as she did not like the way they made her feel. She states she has done well on Lamictal in the past and would like to try Lamictal again.     Initial Psych ROS:  Depression: no appetite/weight changes, insomnia/hypersomnia, difficulty concentrating, or recurrent thoughts of death or SI  Sleep: Sleeps 8  hours a night on average. Wakes up fatigued.  Sallie: No episodes of expansive mood, decreased need for sleep, increased goal directed behaviors, or racing thoughts  Anxiety: no panic attacks, agoraphobia, phobias, avoidance, or somatic related complaints   OCD: no obsessions or compulsive behaviors   PTSD: no flashbacks, nightmares, or avoidance of stimuli  Psychosis: no current A/V hallucinations or delusions - She does report feeling as though she "was being haunted by things in closet, I would talk to it," but only after beginning an unknown multidrug regimen in 2013  SI/HI - passive SI, reports fleeting thoughts such as "What it I  wasn't here?" However she states, "I would never do that." Denies a plan or suicidal intent. Reports she has her son to live for. Agrees to call the clinic or 911 if she develops SI with intent or plan  Access to guns: denies       Past Psychiatric History:  Past Psych Hx: anxiety and mood disorder  Dr Jimenez in 2013  First psych contact: inpatient psych admission at " "[facility] in 2013 after attempted suicide by unknown means, possible overdose     Prior hospitalizations: 2-3 times at [facility] for suicide attempts; she reports she has difficulty remembering this part of her life and cannot remember the method of theses attempts "maybe pills"  Prior suicide attempts or self-harm: 2-3, see above; denies cutting  Prior diagnosis: bipolar disorder, social phobia  Prior meds: Prozac, Lamictal, other unknown medications  Current meds: none  Prior psychotherapy: 1 x month with  for last year      Past Medical Hx:   Past Medical History:   Diagnosis Date    History of psychiatric care     History of psychiatric hospitalization     Self-harming behavior     Suicide attempt    Hx of TBI: no      Hx of seizures: no        Past Surgical Hx:        Past Surgical History:   Procedure Laterality Date    ADENOIDECTOMY        TONSILLECTOMY             Family Hx:         Family History   Problem Relation Age of Onset    Diabetes Paternal Grandmother      Bipolar disorder Neg Hx      Schizophrenia Neg Hx        Paternal: father hx of ADHD; several family members with alcohol use disorder   Maternal: mother - undiagnosed but anxious, irritable  Siblings: 1 sister with similar symptoms of anxiety      Social Hx:   Social History              Socioeconomic History    Marital status: Single   Tobacco Use    Smoking status: Former Smoker    Smokeless tobacco: Never Used   Substance and Sexual Activity    Alcohol use: Yes   Other Topics Concern    Childhood History: Raised by parents Yes    Education: High School Graduate Yes         Childhood: Born in [place], raised in [place]  Marital Status: never   Children: 1 year old sonDevante  Resides: [place]  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[employer]; student  Hobbies: none currently, she reports she is trying to read again, however between her 1 year old and school, she does not have much time for " herself  [employer]:  denies  Education level: some college, currently attending [employer] majoring in social work, expected graduation date 2023  : denies   Legal: denies     Substance Hx:  Caffeine: Coffee 3-4 cups per coffee/day if she is at work or school  Tobacco:  former smoker, quit in Jan 2020  Alcohol: occasional, approximately once every few months, 2-3 drinks per occasion  Drug use: no  Rehab: no  Prior/current AA: no    Interim hx:     Medication changes last visit:   · Continue Lamictal 200 mg po daily for mood  · Increase Wellbutrin  mg q.a.m. to 300 mg po q.a.m.  to target anxiety and mood  · Continue hydroxyzine 25 mg po TID PRN anxiety  · Start trazodone  mg po q.h.s. PRN insomnia    Medication changes on 12/2  · Increase Lamictal to 200 mg po daily to target irritability and concentration deficit  · Start Wellbutrin  mg q.a.m. to target anxiety and irritability  · Start hydroxyzine 25 mg po TID PRN anxiety    Anxiety: decreased  Depression: decreased  Irritability: decreased     Denies suicidal/homicidal ideations.  Denies hopelessness/worthlessness.    Denies auditory/visual hallucinations      Alcohol/Drugs/Caffeine/Tobacco: No change in consumption    Review of Systems   · PSYCHIATRIC: Pertinant items are noted in the narrative.  · All other systems reviewed and found to be negative    Past Medical, Family and Social History: The patient's past medical, family and social history have been reviewed and updated as appropriate within the electronic medical record - see encounter notes.    Medications: Lamictal 200 mg po daily, Wellbutrin  mg q.a.m., hydroxyzine 25 mg po TID PRN anxiety, trazodone  mg p.o. q.h.s. p.r.n. insomnia    Compliance: yes    Side effects: None    Risk Parameters:  Patient reports no suicidal ideation  Patient reports no homicidal ideation  Patient reports no self-injurious behavior  Patient reports no violent  behavior    Exam   Constitutional  Vitals:  Most recent vital signs, dated greater than 90 days prior to this appointment, were reviewed.   Last 3 sets of Vitals    Vitals - 1 value per visit 11/4/2021 12/2/2021 12/2/2021   SYSTOLIC 109 - 117   DIASTOLIC 76 - 73   Pulse 81 - 90   Temp - - -   Resp - - -   SPO2 - - -   Weight (lb) 163.47 - 165.9   Weight (kg) 74.15 - 75.25   Height 64 - 64   BMI (Calculated) 28 - 28.5   VISIT REPORT - - -   Pain Score  - 0 -          General:  unremarkable, age appropriate, normal weight, well nourished, casually dressed     Musculoskeletal  Muscle Strength/Tone:  no rigidity, no dystonia, no tremor, no tic   Gait & Station:  non-ataxic     Psychiatric  Speech:  no latency; no press   Mood & Affect:  happy  congruent and appropriate   Thought Process:  normal and logical   Associations:  intact   Thought Content:  normal, no suicidality, no homicidality, delusions, or paranoia   Insight:  intact   Judgement: behavior is adequate to circumstances   Orientation:  grossly intact   Memory: intact for content of interview   Language: grossly intact   Attention Span & Concentration:  able to focus   Fund of Knowledge:  intact and appropriate to age and level of education     Suicide Risk Assessment:  Protective factors: age, gender, no ongoing substance abuse, no psychosis, has a son, denies active SI/intent/plan, seeking treatment, access to treatment, future oriented, good primary support, no access to firearms  Risks: 2-3 prior attempts, 2-3 prior hospitalizations,  Patient is a low immediate and long-term risk considering risk factors. Patient denied suicidal or homicidal ideation, plan, or intent.  Patient noted agreement to call 911 and/or present to the nearest emergency department if Pt develops suicidal or homicidal ideation, plan, or intent.      Assessment and Diagnosis   Status/Progress: Based on the examination today, the patient's problem(s) is/are improved.  New problems  have not been presented today.   Co-morbidities are not complicating management of the primary condition.  The working differential for this patient includes bipolar disorder, mood disorder NOS, social anxiety, intermittent explosive disorder.     General Impression: Pt is a 26 y.o. female with past history of anxiety and mood disorder presenting today for a follow up visit with continued improvement in symptoms. She denies racing thoughts, increased distractibility, increase in goal directed activity. She denies elevated or expansive mood, inflated self-esteem or grandiosity, flight of ideas, or excessive involvement in activities that have a high potential for painful consequences.     Per shared decision making with the Pt, medications will be continued at current doses as she has achieved early remission.  Referral for individual psychotherapy will be placed per patient request.    Safe for outpatient follow up and no acute safety concerns.    Encounter Diagnoses   Name Primary?    Recurrent major depressive disorder, in partial remission     Psychophysiological insomnia          Intervention/Counseling/Treatment Plan   · Medication Management: The risks and benefits of medication were discussed with the patient. Shared decision making occurred   · The treatment plan and follow up plan were reviewed with the patient.   · ContinueLamictal 200 mg po daily for mood  · Continue Wellbutrin  mg po q.a.m.  to target anxiety and mood  · Continue hydroxyzine 25 mg po TID PRN anxiety  · Continue trazodone  mg po q.h.s. PRN insomnia  · Referral for individual psychotherapy placed to Amanda Warner LCSW   · Call to report any worsening of symptoms or problems with the medication. Pt instructed to go to ER with thoughts of harming self, others  · Labs: no new orders      Psychotherapy:    Target symptoms: depression, anxiety    Outcome monitoring methods: self-report, observation   Therapeutic Intervention  Type: supportive psychotherapy   Why chosen therapy is appropriate versus another modality: relevant to diagnosis, patient responds to this modality   Patient's response to intervention:The patient's response to intervention is accepting.   Progress toward goals: The patient's progress toward goals is good.   Topics discussed/themes: building skills sets for symptom management, symptom recognition, nutrition, exercise   Duration of intervention: 16 minutes    Return to Clinic: 1 month    -Pt given contact number for psychotherapists at [facility] ans also instructed they may check with insurance for a list of providers  -Spent 30min face to face with the pt; >50% time spent in counseling   -Supportive therapy and psychoeducation provided  -R/B/SE's of medications discussed with the pt who expresses understanding and chooses to take medications as prescribed.   -Pt instructed to call clinic, 911 or go to nearest emergency room if sxs worsen or pt is in   crisis. The pt expresses understanding.    EVI Turner, PMHNP-BC  Department of Psychiatry - [facility]  [address]  [place] [address]  Office: [phone]  Fax: [phone]

## 2022-04-06 ENCOUNTER — PATIENT MESSAGE (OUTPATIENT)
Dept: PSYCHIATRY | Facility: CLINIC | Age: 27
End: 2022-04-06
Payer: COMMERCIAL

## 2022-05-03 ENCOUNTER — OFFICE VISIT (OUTPATIENT)
Dept: OBSTETRICS AND GYNECOLOGY | Facility: CLINIC | Age: 27
End: 2022-05-03
Payer: COMMERCIAL

## 2022-05-03 VITALS
DIASTOLIC BLOOD PRESSURE: 90 MMHG | SYSTOLIC BLOOD PRESSURE: 122 MMHG | WEIGHT: 169.75 LBS | BODY MASS INDEX: 29.14 KG/M2

## 2022-05-03 DIAGNOSIS — Z30.9 ENCOUNTER FOR CONTRACEPTIVE MANAGEMENT, UNSPECIFIED TYPE: Primary | ICD-10-CM

## 2022-05-03 PROCEDURE — 3008F BODY MASS INDEX DOCD: CPT | Mod: CPTII,S$GLB,, | Performed by: STUDENT IN AN ORGANIZED HEALTH CARE EDUCATION/TRAINING PROGRAM

## 2022-05-03 PROCEDURE — 3080F PR MOST RECENT DIASTOLIC BLOOD PRESSURE >= 90 MM HG: ICD-10-PCS | Mod: CPTII,S$GLB,, | Performed by: STUDENT IN AN ORGANIZED HEALTH CARE EDUCATION/TRAINING PROGRAM

## 2022-05-03 PROCEDURE — 1159F MED LIST DOCD IN RCRD: CPT | Mod: CPTII,S$GLB,, | Performed by: STUDENT IN AN ORGANIZED HEALTH CARE EDUCATION/TRAINING PROGRAM

## 2022-05-03 PROCEDURE — 3008F PR BODY MASS INDEX (BMI) DOCUMENTED: ICD-10-PCS | Mod: CPTII,S$GLB,, | Performed by: STUDENT IN AN ORGANIZED HEALTH CARE EDUCATION/TRAINING PROGRAM

## 2022-05-03 PROCEDURE — 3074F PR MOST RECENT SYSTOLIC BLOOD PRESSURE < 130 MM HG: ICD-10-PCS | Mod: CPTII,S$GLB,, | Performed by: STUDENT IN AN ORGANIZED HEALTH CARE EDUCATION/TRAINING PROGRAM

## 2022-05-03 PROCEDURE — 99213 OFFICE O/P EST LOW 20 MIN: CPT | Mod: PBBFAC,PN | Performed by: STUDENT IN AN ORGANIZED HEALTH CARE EDUCATION/TRAINING PROGRAM

## 2022-05-03 PROCEDURE — 3074F SYST BP LT 130 MM HG: CPT | Mod: CPTII,S$GLB,, | Performed by: STUDENT IN AN ORGANIZED HEALTH CARE EDUCATION/TRAINING PROGRAM

## 2022-05-03 PROCEDURE — 99999 PR PBB SHADOW E&M-EST. PATIENT-LVL III: ICD-10-PCS | Mod: PBBFAC,,, | Performed by: STUDENT IN AN ORGANIZED HEALTH CARE EDUCATION/TRAINING PROGRAM

## 2022-05-03 PROCEDURE — 1159F PR MEDICATION LIST DOCUMENTED IN MEDICAL RECORD: ICD-10-PCS | Mod: CPTII,S$GLB,, | Performed by: STUDENT IN AN ORGANIZED HEALTH CARE EDUCATION/TRAINING PROGRAM

## 2022-05-03 PROCEDURE — 99203 OFFICE O/P NEW LOW 30 MIN: CPT | Mod: S$GLB,,, | Performed by: STUDENT IN AN ORGANIZED HEALTH CARE EDUCATION/TRAINING PROGRAM

## 2022-05-03 PROCEDURE — 99999 PR PBB SHADOW E&M-EST. PATIENT-LVL III: CPT | Mod: PBBFAC,,, | Performed by: STUDENT IN AN ORGANIZED HEALTH CARE EDUCATION/TRAINING PROGRAM

## 2022-05-03 PROCEDURE — 99203 PR OFFICE/OUTPT VISIT, NEW, LEVL III, 30-44 MIN: ICD-10-PCS | Mod: S$GLB,,, | Performed by: STUDENT IN AN ORGANIZED HEALTH CARE EDUCATION/TRAINING PROGRAM

## 2022-05-03 PROCEDURE — 3080F DIAST BP >= 90 MM HG: CPT | Mod: CPTII,S$GLB,, | Performed by: STUDENT IN AN ORGANIZED HEALTH CARE EDUCATION/TRAINING PROGRAM

## 2022-05-03 RX ORDER — DROSPIRENONE AND ETHINYL ESTRADIOL 0.02-3(28)
1 KIT ORAL DAILY
Qty: 90 TABLET | Refills: 3 | Status: SHIPPED | OUTPATIENT
Start: 2022-05-03 | End: 2022-12-29 | Stop reason: SDUPTHER

## 2022-05-03 NOTE — PROGRESS NOTES
History & Physical  Gynecology      SUBJECTIVE:     Chief Complaint: Establish Care and Discuss birth control       History of Present Illness:    26 y.o.  presents today for contraception. Doing well. Regular periods. Was thinking about IUD but has decided on OCPs.     Denies hx of abnormal pap       Review of patient's allergies indicates:  No Known Allergies    Past Medical History:   Diagnosis Date    History of psychiatric care     History of psychiatric hospitalization     Self-harming behavior     Suicide attempt      Past Surgical History:   Procedure Laterality Date    ADENOIDECTOMY      TONSILLECTOMY       OB History        1    Para   1    Term   1       0    AB   0    Living   1       SAB   0    IAB   0    Ectopic   0    Multiple   0    Live Births   1               Family History   Problem Relation Age of Onset    Diabetes Paternal Grandmother     Bipolar disorder Neg Hx     Schizophrenia Neg Hx     Breast cancer Neg Hx     Ovarian cancer Neg Hx      Social History     Tobacco Use    Smoking status: Former Smoker    Smokeless tobacco: Never Used   Substance Use Topics    Alcohol use: Yes    Drug use: Never       Current Outpatient Medications   Medication Sig    buPROPion (WELLBUTRIN XL) 300 MG 24 hr tablet Take 1 tablet (300 mg total) by mouth once daily.    drospirenone-ethinyl estradioL (IZABEL) 3-0.02 mg per tablet Take 1 tablet by mouth once daily.    lamoTRIgine (LAMICTAL) 100 MG tablet Take 2 tablets (200 mg total) by mouth once daily.    traZODone (DESYREL) 50 MG tablet Take 1-2 tablets ( mg total) by mouth every evening.     No current facility-administered medications for this visit.         Review of Systems:  Review of Systems     OBJECTIVE:     Physical Exam:  Physical Exam  Vitals reviewed.   Constitutional:       General: She is not in acute distress.     Appearance: Normal appearance. She is well-developed.   HENT:      Head: Normocephalic and  atraumatic.   Cardiovascular:      Rate and Rhythm: Normal rate and regular rhythm.   Pulmonary:      Effort: Pulmonary effort is normal.   Abdominal:      General: There is no distension.      Palpations: Abdomen is soft.   Genitourinary:     Vagina: Normal.   Skin:     General: Skin is warm.   Neurological:      Mental Status: She is alert and oriented to person, place, and time.   Psychiatric:         Behavior: Behavior normal.         Thought Content: Thought content normal.         Judgment: Judgment normal.           ASSESSMENT:       ICD-10-CM ICD-9-CM    1. Encounter for contraceptive management, unspecified type  Z30.9 V25.9        No orders of the defined types were placed in this encounter.          Plan:      - The use of hormonal contraception has been fully discussed with the patient. We discussed all options including OCPs, transdermal patches, vaginal ring, Depo Provera injections, Implanon, and IUD. Warnings about anticipated minor side effects such as breakthrough spotting, nausea, breast tenderness, weight changes, acne, headaches, etc were given.  She has been told of the more serious potential side effects such as MI, stroke, and deep vein thrombosis, all of which are very unlikely.  She has been asked to report any signs of such serious problems immediately. The need for additional protection, such as a condom, to prevent exposure to sexually transmitted diseases has also been discussed- the patient has been clearly reminded that no hormonal contraceptive method can protect her against diseases such as HIV and others. She understands and wishes to take the medication as prescribed. Patient was given the opportunity to ask questions. She wishes to begin oral contraceptives (estrogen/progesterone)  - Patient counseled extensively on OCP use. Contraindications for OCPs include h/o VTE, age > 35yr and smoking, migraines with auras. Counseled that OCPs do not protect against STDs or HIV. Advised  that patient can start OCP pack today, however backup contraception should be employed for the first 2 weeks. If she misses a day, she can take 2 pills the following day. However if she misses 2 days in a row, she should start a new pack. All questions answered and patient voiced understanding.  - she is a smoker, aware increase risk of blood clot. Recommended progesterone IUD    F/u in 2months, annual at that time.    Rani Jean Baptiste M.D.  Obstetrics and Gynecology

## 2022-05-07 ENCOUNTER — PATIENT MESSAGE (OUTPATIENT)
Dept: OBSTETRICS AND GYNECOLOGY | Facility: CLINIC | Age: 27
End: 2022-05-07
Payer: COMMERCIAL

## 2022-07-05 ENCOUNTER — OFFICE VISIT (OUTPATIENT)
Dept: OBSTETRICS AND GYNECOLOGY | Facility: CLINIC | Age: 27
End: 2022-07-05
Payer: COMMERCIAL

## 2022-07-05 ENCOUNTER — OFFICE VISIT (OUTPATIENT)
Dept: URGENT CARE | Facility: CLINIC | Age: 27
End: 2022-07-05
Payer: COMMERCIAL

## 2022-07-05 VITALS
DIASTOLIC BLOOD PRESSURE: 74 MMHG | WEIGHT: 172.81 LBS | SYSTOLIC BLOOD PRESSURE: 106 MMHG | BODY MASS INDEX: 29.67 KG/M2

## 2022-07-05 VITALS
BODY MASS INDEX: 29.37 KG/M2 | OXYGEN SATURATION: 99 % | RESPIRATION RATE: 16 BRPM | HEART RATE: 95 BPM | DIASTOLIC BLOOD PRESSURE: 70 MMHG | SYSTOLIC BLOOD PRESSURE: 111 MMHG | WEIGHT: 172 LBS | HEIGHT: 64 IN | TEMPERATURE: 99 F

## 2022-07-05 DIAGNOSIS — Z30.2 STERILIZATION: ICD-10-CM

## 2022-07-05 DIAGNOSIS — H66.93 BILATERAL OTITIS MEDIA, UNSPECIFIED OTITIS MEDIA TYPE: ICD-10-CM

## 2022-07-05 DIAGNOSIS — R05.9 COUGH: Primary | ICD-10-CM

## 2022-07-05 DIAGNOSIS — Z01.419 WELL WOMAN EXAM: Primary | ICD-10-CM

## 2022-07-05 LAB
CTP QC/QA: YES
SARS-COV-2 RDRP RESP QL NAA+PROBE: NEGATIVE

## 2022-07-05 PROCEDURE — 3074F SYST BP LT 130 MM HG: CPT | Mod: CPTII,S$GLB,, | Performed by: STUDENT IN AN ORGANIZED HEALTH CARE EDUCATION/TRAINING PROGRAM

## 2022-07-05 PROCEDURE — 3074F SYST BP LT 130 MM HG: CPT | Mod: CPTII,S$GLB,, | Performed by: EMERGENCY MEDICINE

## 2022-07-05 PROCEDURE — 3008F PR BODY MASS INDEX (BMI) DOCUMENTED: ICD-10-PCS | Mod: CPTII,S$GLB,, | Performed by: STUDENT IN AN ORGANIZED HEALTH CARE EDUCATION/TRAINING PROGRAM

## 2022-07-05 PROCEDURE — 90651 HPV VACCINE 9-VALENT 3 DOSE IM: ICD-10-PCS | Mod: S$GLB,,, | Performed by: STUDENT IN AN ORGANIZED HEALTH CARE EDUCATION/TRAINING PROGRAM

## 2022-07-05 PROCEDURE — 99999 PR PBB SHADOW E&M-EST. PATIENT-LVL III: CPT | Mod: PBBFAC,,, | Performed by: STUDENT IN AN ORGANIZED HEALTH CARE EDUCATION/TRAINING PROGRAM

## 2022-07-05 PROCEDURE — 99395 PR PREVENTIVE VISIT,EST,18-39: ICD-10-PCS | Mod: 25,S$GLB,, | Performed by: STUDENT IN AN ORGANIZED HEALTH CARE EDUCATION/TRAINING PROGRAM

## 2022-07-05 PROCEDURE — 3074F PR MOST RECENT SYSTOLIC BLOOD PRESSURE < 130 MM HG: ICD-10-PCS | Mod: CPTII,S$GLB,, | Performed by: EMERGENCY MEDICINE

## 2022-07-05 PROCEDURE — 3008F PR BODY MASS INDEX (BMI) DOCUMENTED: ICD-10-PCS | Mod: CPTII,S$GLB,, | Performed by: EMERGENCY MEDICINE

## 2022-07-05 PROCEDURE — 1159F PR MEDICATION LIST DOCUMENTED IN MEDICAL RECORD: ICD-10-PCS | Mod: CPTII,S$GLB,, | Performed by: STUDENT IN AN ORGANIZED HEALTH CARE EDUCATION/TRAINING PROGRAM

## 2022-07-05 PROCEDURE — 3078F DIAST BP <80 MM HG: CPT | Mod: CPTII,S$GLB,, | Performed by: STUDENT IN AN ORGANIZED HEALTH CARE EDUCATION/TRAINING PROGRAM

## 2022-07-05 PROCEDURE — 1159F PR MEDICATION LIST DOCUMENTED IN MEDICAL RECORD: ICD-10-PCS | Mod: CPTII,S$GLB,, | Performed by: EMERGENCY MEDICINE

## 2022-07-05 PROCEDURE — 88175 CYTOPATH C/V AUTO FLUID REDO: CPT | Performed by: STUDENT IN AN ORGANIZED HEALTH CARE EDUCATION/TRAINING PROGRAM

## 2022-07-05 PROCEDURE — 3078F DIAST BP <80 MM HG: CPT | Mod: CPTII,S$GLB,, | Performed by: EMERGENCY MEDICINE

## 2022-07-05 PROCEDURE — 99999 PR PBB SHADOW E&M-EST. PATIENT-LVL III: ICD-10-PCS | Mod: PBBFAC,,, | Performed by: STUDENT IN AN ORGANIZED HEALTH CARE EDUCATION/TRAINING PROGRAM

## 2022-07-05 PROCEDURE — 90651 9VHPV VACCINE 2/3 DOSE IM: CPT | Mod: S$GLB,,, | Performed by: STUDENT IN AN ORGANIZED HEALTH CARE EDUCATION/TRAINING PROGRAM

## 2022-07-05 PROCEDURE — 3074F PR MOST RECENT SYSTOLIC BLOOD PRESSURE < 130 MM HG: ICD-10-PCS | Mod: CPTII,S$GLB,, | Performed by: STUDENT IN AN ORGANIZED HEALTH CARE EDUCATION/TRAINING PROGRAM

## 2022-07-05 PROCEDURE — 1160F RVW MEDS BY RX/DR IN RCRD: CPT | Mod: CPTII,S$GLB,, | Performed by: EMERGENCY MEDICINE

## 2022-07-05 PROCEDURE — 99214 PR OFFICE/OUTPT VISIT, EST, LEVL IV, 30-39 MIN: ICD-10-PCS | Mod: S$GLB,,, | Performed by: EMERGENCY MEDICINE

## 2022-07-05 PROCEDURE — 90471 IMMUNIZATION ADMIN: CPT | Mod: S$GLB,,, | Performed by: STUDENT IN AN ORGANIZED HEALTH CARE EDUCATION/TRAINING PROGRAM

## 2022-07-05 PROCEDURE — 99395 PREV VISIT EST AGE 18-39: CPT | Mod: 25,S$GLB,, | Performed by: STUDENT IN AN ORGANIZED HEALTH CARE EDUCATION/TRAINING PROGRAM

## 2022-07-05 PROCEDURE — U0002 COVID-19 LAB TEST NON-CDC: HCPCS | Mod: QW,S$GLB,, | Performed by: EMERGENCY MEDICINE

## 2022-07-05 PROCEDURE — 99214 OFFICE O/P EST MOD 30 MIN: CPT | Mod: S$GLB,,, | Performed by: EMERGENCY MEDICINE

## 2022-07-05 PROCEDURE — 3008F BODY MASS INDEX DOCD: CPT | Mod: CPTII,S$GLB,, | Performed by: EMERGENCY MEDICINE

## 2022-07-05 PROCEDURE — 1159F MED LIST DOCD IN RCRD: CPT | Mod: CPTII,S$GLB,, | Performed by: STUDENT IN AN ORGANIZED HEALTH CARE EDUCATION/TRAINING PROGRAM

## 2022-07-05 PROCEDURE — 90471 HPV VACCINE 9-VALENT 3 DOSE IM: ICD-10-PCS | Mod: S$GLB,,, | Performed by: STUDENT IN AN ORGANIZED HEALTH CARE EDUCATION/TRAINING PROGRAM

## 2022-07-05 PROCEDURE — 1160F PR REVIEW ALL MEDS BY PRESCRIBER/CLIN PHARMACIST DOCUMENTED: ICD-10-PCS | Mod: CPTII,S$GLB,, | Performed by: EMERGENCY MEDICINE

## 2022-07-05 PROCEDURE — 3078F PR MOST RECENT DIASTOLIC BLOOD PRESSURE < 80 MM HG: ICD-10-PCS | Mod: CPTII,S$GLB,, | Performed by: EMERGENCY MEDICINE

## 2022-07-05 PROCEDURE — U0002: ICD-10-PCS | Mod: QW,S$GLB,, | Performed by: EMERGENCY MEDICINE

## 2022-07-05 PROCEDURE — 3078F PR MOST RECENT DIASTOLIC BLOOD PRESSURE < 80 MM HG: ICD-10-PCS | Mod: CPTII,S$GLB,, | Performed by: STUDENT IN AN ORGANIZED HEALTH CARE EDUCATION/TRAINING PROGRAM

## 2022-07-05 PROCEDURE — 3008F BODY MASS INDEX DOCD: CPT | Mod: CPTII,S$GLB,, | Performed by: STUDENT IN AN ORGANIZED HEALTH CARE EDUCATION/TRAINING PROGRAM

## 2022-07-05 PROCEDURE — 1159F MED LIST DOCD IN RCRD: CPT | Mod: CPTII,S$GLB,, | Performed by: EMERGENCY MEDICINE

## 2022-07-05 RX ORDER — PREDNISONE 20 MG/1
TABLET ORAL
Qty: 9 TABLET | Refills: 0 | Status: SHIPPED | OUTPATIENT
Start: 2022-07-05 | End: 2022-07-11

## 2022-07-05 RX ORDER — AMOXICILLIN 875 MG/1
875 TABLET, FILM COATED ORAL EVERY 12 HOURS
Qty: 10 TABLET | Refills: 0 | Status: SHIPPED | OUTPATIENT
Start: 2022-07-05 | End: 2022-07-10

## 2022-07-05 NOTE — PROGRESS NOTES
"Subjective:       Patient ID: Mirella Kline is a 26 y.o. female.    Vitals:  height is 5' 4" (1.626 m) and weight is 78 kg (172 lb). Her oral temperature is 98.5 °F (36.9 °C). Her blood pressure is 111/70 and her pulse is 95. Her respiration is 16 and oxygen saturation is 99%.     Chief Complaint: Cough and Headache    Pt presents today with cough, HA, ear fullness X's 5  Days. Pt has taken ibuprofen with mild relief. Pt is vaccinated, pt has no known + covid exp.  Patient says she did have fever over the weekend, 3 days ago.  She does not have significant cough today but has decreased hearing bilaterally.  No shortness of breath, no headache or neck stiffness.  No rashes.  She feels tender glands under her ears bilaterally.  No nausea vomiting or diarrhea.  Patient says she is definitely not pregnant.    Cough  This is a new problem. The current episode started in the past 7 days. The problem has been unchanged. The problem occurs constantly. Associated symptoms include ear congestion and headaches. Treatments tried: ibuprofen. The treatment provided mild relief.   Headache   Associated symptoms include coughing.       Respiratory: Positive for cough.    Neurological: Positive for headaches.       Objective:      Physical Exam   Constitutional: She is oriented to person, place, and time. She appears well-developed. She is cooperative.  Non-toxic appearance. She does not appear ill. No distress.   HENT:   Head: Normocephalic and atraumatic.   Ears:   Right Ear: Hearing, external ear and ear canal normal.   Left Ear: Hearing, external ear and ear canal normal.      Comments: Bilateral tympanic membrane erythema with effusion.  Nose: Nose normal. No mucosal edema, rhinorrhea, nasal deformity or congestion. No epistaxis. Right sinus exhibits no maxillary sinus tenderness and no frontal sinus tenderness. Left sinus exhibits no maxillary sinus tenderness and no frontal sinus tenderness.   Mouth/Throat: Uvula is " midline, oropharynx is clear and moist and mucous membranes are normal. Mucous membranes are moist. No trismus in the jaw. Normal dentition. No uvula swelling. No oropharyngeal exudate, posterior oropharyngeal edema or posterior oropharyngeal erythema.      Comments: Bilateral postnasal drip in the posterior oropharynx with minimal erythema.  Eyes: Conjunctivae and lids are normal. No scleral icterus.   Neck: Trachea normal and phonation normal. Neck supple. No edema present. No erythema present. No neck rigidity present.   Cardiovascular: Normal rate, regular rhythm, normal heart sounds and normal pulses.   Pulmonary/Chest: Effort normal and breath sounds normal. No respiratory distress. She has no decreased breath sounds. She has no wheezes. She has no rhonchi. She has no rales.   Abdominal: Normal appearance.   Musculoskeletal: Normal range of motion.         General: No deformity. Normal range of motion.   Lymphadenopathy:     She has cervical adenopathy.   Neurological: no focal deficit. She is alert and oriented to person, place, and time. She exhibits normal muscle tone. Coordination normal.   Skin: Skin is warm, dry, intact, not diaphoretic and not pale.   Psychiatric: Her speech is normal and behavior is normal. Mood, judgment and thought content normal.   Nursing note and vitals reviewed.      Results for orders placed or performed in visit on 07/05/22   POCT COVID-19 Rapid Screening   Result Value Ref Range    POC Rapid COVID Negative Negative     Acceptable Yes          Patient with fever, bilateral ear discomfort and marked TM erythema.  Will treat with antibiotic and steroid.      Assessment:       1. Cough    2. Bilateral otitis media, unspecified otitis media type          Plan:         Cough  -     POCT COVID-19 Rapid Screening    Bilateral otitis media, unspecified otitis media type  -     amoxicillin (AMOXIL) 875 MG tablet; Take 1 tablet (875 mg total) by mouth every 12 (twelve)  hours. for 5 days  Dispense: 10 tablet; Refill: 0  -     predniSONE (DELTASONE) 20 MG tablet; Take 1 tablet (20 mg total) by mouth 2 (two) times daily for 3 days, THEN 1 tablet (20 mg total) once daily for 3 days.  Dispense: 9 tablet; Refill: 0

## 2022-07-05 NOTE — PROGRESS NOTES
History & Physical  Gynecology      SUBJECTIVE:     Chief Complaint: Follow-up and Annual Exam       History of Present Illness:    Here today for f/u on OCPS and annual exam. Interested in talking about sterilization.    Gyn: regular periods on OCPs. No hx of STI. No hx of abnormal pap. No immediate FH of gyn or colon cancer. Had 1 dose of HPV    Still vaping       Review of patient's allergies indicates:  No Known Allergies    Past Medical History:   Diagnosis Date    History of psychiatric care     History of psychiatric hospitalization     Self-harming behavior     Suicide attempt      Past Surgical History:   Procedure Laterality Date    ADENOIDECTOMY      TONSILLECTOMY       OB History        1    Para   1    Term   1       0    AB   0    Living   1       SAB   0    IAB   0    Ectopic   0    Multiple   0    Live Births   1               Family History   Problem Relation Age of Onset    Diabetes Paternal Grandmother     Bipolar disorder Neg Hx     Schizophrenia Neg Hx     Breast cancer Neg Hx     Ovarian cancer Neg Hx      Social History     Tobacco Use    Smoking status: Former Smoker    Smokeless tobacco: Never Used   Substance Use Topics    Alcohol use: Yes    Drug use: Never       Current Outpatient Medications   Medication Sig    buPROPion (WELLBUTRIN XL) 300 MG 24 hr tablet Take 1 tablet (300 mg total) by mouth once daily.    drospirenone-ethinyl estradioL (IZABEL) 3-0.02 mg per tablet Take 1 tablet by mouth once daily.    lamoTRIgine (LAMICTAL) 100 MG tablet Take 2 tablets (200 mg total) by mouth once daily.    traZODone (DESYREL) 50 MG tablet Take 1-2 tablets ( mg total) by mouth every evening.     No current facility-administered medications for this visit.         Review of Systems:  Review of Systems   Constitutional: Negative for chills, fatigue and fever.   HENT: Negative for congestion.    Eyes: Negative for visual disturbance.   Respiratory: Negative for cough  and shortness of breath.    Cardiovascular: Negative for chest pain and palpitations.   Gastrointestinal: Negative for abdominal distention, abdominal pain, constipation, diarrhea, nausea and vomiting.   Genitourinary: Negative for difficulty urinating, dysuria, hematuria, vaginal bleeding and vaginal discharge.   Skin: Negative for rash.   Neurological: Negative for dizziness, seizures, light-headedness and headaches.   Hematological: Does not bruise/bleed easily.   Psychiatric/Behavioral: Negative for dysphoric mood. The patient is not nervous/anxious.         OBJECTIVE:     Physical Exam:  Physical Exam  Vitals reviewed.   Constitutional:       General: She is not in acute distress.     Appearance: She is well-developed.   HENT:      Head: Normocephalic and atraumatic.   Cardiovascular:      Rate and Rhythm: Normal rate and regular rhythm.   Pulmonary:      Effort: Pulmonary effort is normal.   Chest:   Breasts:      Right: No inverted nipple, mass, nipple discharge, skin change or tenderness.      Left: No inverted nipple, mass, nipple discharge, skin change or tenderness.       Abdominal:      General: There is no distension.      Palpations: Abdomen is soft.   Genitourinary:     Vagina: Normal.      Comments: Normal external female genitalia, normal hair distribution. Vaginal mucosa pink, moist, well rugated, scant white physiologic discharge. No blood in vault. Cervix pink, non-friable, without lesion. No CMT. Uterus non tender, mobile, not enlarged. Adnexa without fullness or tenderness.    Skin:     General: Skin is warm.   Neurological:      Mental Status: She is alert and oriented to person, place, and time.   Psychiatric:         Behavior: Behavior normal.         Thought Content: Thought content normal.         Judgment: Judgment normal.           ASSESSMENT:       ICD-10-CM ICD-9-CM    1. Well woman exam  Z01.419 V72.31 Liquid-Based Pap Smear, Screening   2. Sterilization  Z30.2 V25.2        Orders  Placed This Encounter   Procedures    (In Office Administered) HPV Vaccine (9-Valent) (3 Dose) (IM)           Plan:      - Pap today  - tobacco cessation n/a  - contraception OCPs, discussed salpingectomy today   - HPV vaccine series continued today   - Safe Sex discussed    - Counseled to take daily multivitamin. If patient is of reproductive age and not on contraception, to take prenatal vitamin. Patient has been counseled on the vitamin D and calcium requirements per ACOG recommendations.  Age   Calcium(mg/day)   Vitamin D (IU/day)  9-18    1300                       600  19-50  1000                       600  51-70  1200                       600  >70      1,200                      800  Patient to start vitamin   - Covid vaccine n/a  - Discussed IPV, feels safe       Sterilization  - interested in permanent sterilization  - discussed all other options of birth control including OCPs, IUD, patch, nexplanon, etc.   - Discussed permanent procedure and although advertised as such is non reversible. Discussed all kinds of scenarios including current child death, new partner wanting children, her wanting children later in life etc.   - Discussed only way to have children in future would be through IVF and discussed associated cost at least 50K.   - Discussed biggest complication of sterilization is regret. Specially women under 30 have the highest incidence of regret up to 30%.   - She is aware and understands and wants to proceed with permanent sterilization.   - Discussed bilateral salpingectomy to reduce ovarian cancer risk  - medicaid tubal form signed today, will book after maternity leave since have to wait 30 days.   - all questions answered.     Rani Jean Baptiste M.D.  Obstetrics and Gynecology

## 2022-07-08 LAB
FINAL PATHOLOGIC DIAGNOSIS: NORMAL
Lab: NORMAL

## 2022-07-22 ENCOUNTER — PATIENT MESSAGE (OUTPATIENT)
Dept: PSYCHIATRY | Facility: CLINIC | Age: 27
End: 2022-07-22
Payer: COMMERCIAL

## 2022-07-29 ENCOUNTER — OFFICE VISIT (OUTPATIENT)
Dept: PRIMARY CARE CLINIC | Facility: CLINIC | Age: 27
End: 2022-07-29
Payer: COMMERCIAL

## 2022-07-29 DIAGNOSIS — J06.9 URI WITH COUGH AND CONGESTION: Primary | ICD-10-CM

## 2022-07-29 PROCEDURE — 1160F RVW MEDS BY RX/DR IN RCRD: CPT | Mod: CPTII,95,, | Performed by: FAMILY MEDICINE

## 2022-07-29 PROCEDURE — 1159F PR MEDICATION LIST DOCUMENTED IN MEDICAL RECORD: ICD-10-PCS | Mod: CPTII,95,, | Performed by: FAMILY MEDICINE

## 2022-07-29 PROCEDURE — 1160F PR REVIEW ALL MEDS BY PRESCRIBER/CLIN PHARMACIST DOCUMENTED: ICD-10-PCS | Mod: CPTII,95,, | Performed by: FAMILY MEDICINE

## 2022-07-29 PROCEDURE — 99214 PR OFFICE/OUTPT VISIT, EST, LEVL IV, 30-39 MIN: ICD-10-PCS | Mod: 95,,, | Performed by: FAMILY MEDICINE

## 2022-07-29 PROCEDURE — 1159F MED LIST DOCD IN RCRD: CPT | Mod: CPTII,95,, | Performed by: FAMILY MEDICINE

## 2022-07-29 PROCEDURE — 99214 OFFICE O/P EST MOD 30 MIN: CPT | Mod: 95,,, | Performed by: FAMILY MEDICINE

## 2022-07-29 RX ORDER — PREDNISONE 20 MG/1
40 TABLET ORAL DAILY
Qty: 10 TABLET | Refills: 0 | Status: SHIPPED | OUTPATIENT
Start: 2022-07-29 | End: 2022-08-03

## 2022-07-29 RX ORDER — PROMETHAZINE HYDROCHLORIDE AND DEXTROMETHORPHAN HYDROBROMIDE 6.25; 15 MG/5ML; MG/5ML
5 SYRUP ORAL EVERY 6 HOURS PRN
Qty: 120 ML | Refills: 0 | Status: SHIPPED | OUTPATIENT
Start: 2022-07-29 | End: 2022-08-08

## 2022-07-29 NOTE — PATIENT INSTRUCTIONS
"Take all of your medications, as prescribed.    Rest  Stay hydrated, drink plenty of fluids   Use OTC nasal saline spray (Ocenn's, AYR, Arm&Hammer,etc.) to clear nasal drainage and help with nasal congestion  Use an OTC antihistamine (Zyrtec or Claritin) to help dry mucus and post nasal drip  Use OTC Mucinex (plain blue box, no "letters") for sinus/chest congestion  Gargle with warm salt water for throat comfort (10-15 seconds, do NOT swallow!)  Use OTC zinc lozenges or OTC Cepacol lozenges (with benzocaine) for sore throat/cough  Alternate OTC Tylenol and/or Ibuprofen for fever, headache and body aches       "

## 2022-07-29 NOTE — PROGRESS NOTES
Vladimir Marshall MD    [facility]      [address] [place] [address]      Phone: [phone]      Fax: [phone]                  Video Visit  07/29/2022        Subjective        26-year-old female presents today via video visit to discuss cough, congestion.    Patient states she had a sleep over with her friend on Tuesday.  Someone at the Sleep over was sick.  She developed symptoms on Wednesday, 2 days ago.  Since then symptoms have been lingering, progressing.  She reports a dry cough.  Subjective, mild, fever.  No chills, no body aches.  Does report headache, sinus pressure.  Ears have felt full, stuffy, sore.  She reports no significant runny nose, but some mild congestion.  Taste and smell are okay.  No nausea, vomiting, nor diarrhea.  She took some Claritin and Tylenol, which provided some mild relief.    States that she took a home COVID test yesterday, results were negative.    Cough  This is a new problem. The current episode started in the past 7 days. The problem has been gradually worsening. The problem occurs every few minutes. The cough is non-productive. Associated symptoms include ear pain, headaches, nasal congestion, rhinorrhea and a sore throat. Pertinent negatives include no chest pain, chills, ear congestion, fever, heartburn, hemoptysis, myalgias, postnasal drip, rash, shortness of breath, sweats, weight loss or wheezing. Nothing aggravates the symptoms. She has tried rest for the symptoms. The treatment provided no relief. There is no history of asthma, bronchiectasis, bronchitis, COPD, emphysema, environmental allergies or pneumonia.       The following were updated and reviewed by myself in the chart: medications, past medical history, past surgical history, family history, social history, and allergies.     Medications:  Current Outpatient Medications on File Prior to Visit   Medication Sig Dispense Refill    buPROPion (WELLBUTRIN XL) 300 MG 24 hr tablet  Take 1 tablet (300 mg total) by mouth once daily. 30 tablet 0    drospirenone-ethinyl estradioL (IZABEL) 3-0.02 mg per tablet Take 1 tablet by mouth once daily. 90 tablet 3    lamoTRIgine (LAMICTAL) 100 MG tablet Take 2 tablets (200 mg total) by mouth once daily. 60 tablet 0    traZODone (DESYREL) 50 MG tablet Take 1-2 tablets ( mg total) by mouth every evening. 60 tablet 0     No current facility-administered medications on file prior to visit.        PMHx:  Past Medical History:   Diagnosis Date    History of psychiatric care     History of psychiatric hospitalization     Self-harming behavior     Suicide attempt       Patient Active Problem List    Diagnosis Date Noted    Psychophysiological insomnia 01/31/2022    Recurrent major depressive disorder, in partial remission 12/02/2021    TAZ (generalized anxiety disorder) 12/02/2021    Encounter for induction of labor 10/05/2020    Term pregnancy 10/05/2020    Chronic bilateral low back pain without sciatica 06/07/2019    Neck pain 06/07/2019    Anxiety disorder 06/28/2013    Depression 04/03/2013        PSHx:  Past Surgical History:   Procedure Laterality Date    ADENOIDECTOMY      TONSILLECTOMY          FHx:  Family History   Problem Relation Age of Onset    Diabetes Paternal Grandmother     Bipolar disorder Neg Hx     Schizophrenia Neg Hx     Breast cancer Neg Hx     Ovarian cancer Neg Hx         Social:  Social History     Socioeconomic History    Marital status: Single   Tobacco Use    Smoking status: Light Tobacco Smoker    Smokeless tobacco: Never Used   Substance and Sexual Activity    Alcohol use: Yes    Drug use: Never    Sexual activity: Not Currently   Other Topics Concern    Childhood History: Raised by parents Yes    Education: High School Graduate Yes        Allergies:  Review of patient's allergies indicates:  No Known Allergies     ROS:  Review of Systems   Constitutional: Negative for activity change, appetite  "change, chills, fever and weight loss.   HENT: Positive for ear pain, rhinorrhea, sinus pressure and sore throat. Negative for congestion, postnasal drip and trouble swallowing.    Respiratory: Positive for cough. Negative for hemoptysis, shortness of breath and wheezing.    Cardiovascular: Negative for chest pain and palpitations.   Gastrointestinal: Negative for abdominal pain, constipation, diarrhea, heartburn, nausea and vomiting.   Genitourinary: Negative for difficulty urinating.   Musculoskeletal: Negative for arthralgias and myalgias.   Skin: Negative for color change and rash.   Allergic/Immunologic: Negative for environmental allergies.   Neurological: Positive for headaches. Negative for speech difficulty.   All other systems reviewed and are negative.         Objective      There were no vitals taken for this visit.  Ht Readings from Last 3 Encounters:   07/05/22 5' 4" (1.626 m)   09/22/21 5' 4" (1.626 m)   09/01/21 5' 4" (1.626 m)     Wt Readings from Last 3 Encounters:   07/05/22 78 kg (172 lb)   07/05/22 78.4 kg (172 lb 13.5 oz)   05/03/22 77 kg (169 lb 12.1 oz)       PHYSICAL EXAM:  Physical Exam  Constitutional:       General: She is not in acute distress.     Appearance: Normal appearance. She is not ill-appearing.   HENT:      Head: Normocephalic and atraumatic.      Right Ear: External ear normal.      Left Ear: External ear normal.      Nose: Nose normal. No congestion or rhinorrhea.   Eyes:      Extraocular Movements: Extraocular movements intact.   Pulmonary:      Effort: Pulmonary effort is normal. No respiratory distress.   Musculoskeletal:         General: Normal range of motion.   Neurological:      Mental Status: She is alert.   Psychiatric:         Mood and Affect: Mood normal.         Behavior: Behavior normal.              LABS / IMAGING:  Recent Results (from the past 4368 hour(s))   Liquid-Based Pap Smear, Screening    Collection Time: 07/05/22  3:37 PM   Result Value Ref Range    " Final Pathologic Diagnosis       Specimen Adequacy  Satisfactory for interpretation. Endocervical component is present.  [place] Category  Negative for intraepithelial lesion or malignancy.  Marked inflammation.  Shift in isaiah suggestive of bacterial vaginosis.      Disclaimer       The Pap smear is a screening test that aids in the detection of cervical  cancer and cancer precursors. Both false positive and false negative results  can occur. The test should be used at regular intervals, and positive results  should be confirmed before definitive therapy.  This liquid based specimen is processed using the  or  Thin PrepPAP  System. This specimen has been analyzed by the ThinPrep Imaging System  ([employer]), an automated imaging and review system which assists  the laboratory in evaluating cells on ThinPrep PAP tests. Following automated  imaging, selected fields from every slide are reviewed by a cytotechnologist  and/or pathologist.  Screening was performed at [facility], [facility] [place] [address].     POCT COVID-19 Rapid Screening    Collection Time: 07/05/22  5:07 PM   Result Value Ref Range    POC Rapid COVID Negative Negative     Acceptable Yes          Assessment    1. URI with cough and congestion          Plan    Diagnoses and all orders for this visit:    URI with cough and congestion  -     predniSONE (DELTASONE) 20 MG tablet; Take 2 tablets (40 mg total) by mouth once daily. for 5 days  -     promethazine-dextromethorphan (PROMETHAZINE-DM) 6.25-15 mg/5 mL Syrp; Take 5 mLs by mouth every 6 (six) hours as needed (cough).          FOLLOW-UP:  Follow up if symptoms worsen or fail to improve.    The patient location is: [place]  The chief complaint leading to consultation is: URI    Visit type: audiovisual    Face to Face time with patient: 10  30 minutes of total time spent on the encounter, which includes face to  face time and non-face to face time preparing to see the patient (eg, review of tests), Obtaining and/or reviewing separately obtained history, Documenting clinical information in the electronic or other health record, Independently interpreting results (not separately reported) and communicating results to the patient/family/caregiver, or Care coordination (not separately reported).     Each patient to whom he or she provides medical services by telemedicine is:  (1) informed of the relationship between the physician and patient and the respective role of any other health care provider with respect to management of the patient; and (2) notified that he or she may decline to receive medical services by telemedicine and may withdraw from such care at any time.    Signed by:  Vladimir Marshall MD

## 2022-08-08 ENCOUNTER — OFFICE VISIT (OUTPATIENT)
Dept: OBSTETRICS AND GYNECOLOGY | Facility: CLINIC | Age: 27
End: 2022-08-08
Payer: COMMERCIAL

## 2022-08-08 VITALS
BODY MASS INDEX: 29.93 KG/M2 | SYSTOLIC BLOOD PRESSURE: 120 MMHG | DIASTOLIC BLOOD PRESSURE: 80 MMHG | WEIGHT: 174.38 LBS

## 2022-08-08 DIAGNOSIS — Z20.2 POSSIBLE EXPOSURE TO STD: Primary | ICD-10-CM

## 2022-08-08 PROCEDURE — 3079F PR MOST RECENT DIASTOLIC BLOOD PRESSURE 80-89 MM HG: ICD-10-PCS | Mod: CPTII,S$GLB,, | Performed by: OBSTETRICS & GYNECOLOGY

## 2022-08-08 PROCEDURE — 99999 PR PBB SHADOW E&M-EST. PATIENT-LVL III: ICD-10-PCS | Mod: PBBFAC,,, | Performed by: OBSTETRICS & GYNECOLOGY

## 2022-08-08 PROCEDURE — 3008F PR BODY MASS INDEX (BMI) DOCUMENTED: ICD-10-PCS | Mod: CPTII,S$GLB,, | Performed by: OBSTETRICS & GYNECOLOGY

## 2022-08-08 PROCEDURE — 3074F PR MOST RECENT SYSTOLIC BLOOD PRESSURE < 130 MM HG: ICD-10-PCS | Mod: CPTII,S$GLB,, | Performed by: OBSTETRICS & GYNECOLOGY

## 2022-08-08 PROCEDURE — 1160F PR REVIEW ALL MEDS BY PRESCRIBER/CLIN PHARMACIST DOCUMENTED: ICD-10-PCS | Mod: CPTII,S$GLB,, | Performed by: OBSTETRICS & GYNECOLOGY

## 2022-08-08 PROCEDURE — 3074F SYST BP LT 130 MM HG: CPT | Mod: CPTII,S$GLB,, | Performed by: OBSTETRICS & GYNECOLOGY

## 2022-08-08 PROCEDURE — 99204 PR OFFICE/OUTPT VISIT, NEW, LEVL IV, 45-59 MIN: ICD-10-PCS | Mod: S$GLB,,, | Performed by: OBSTETRICS & GYNECOLOGY

## 2022-08-08 PROCEDURE — 87481 CANDIDA DNA AMP PROBE: CPT | Mod: 59 | Performed by: OBSTETRICS & GYNECOLOGY

## 2022-08-08 PROCEDURE — 87591 N.GONORRHOEAE DNA AMP PROB: CPT | Performed by: OBSTETRICS & GYNECOLOGY

## 2022-08-08 PROCEDURE — 87491 CHLMYD TRACH DNA AMP PROBE: CPT | Mod: 59 | Performed by: OBSTETRICS & GYNECOLOGY

## 2022-08-08 PROCEDURE — 99999 PR PBB SHADOW E&M-EST. PATIENT-LVL III: CPT | Mod: PBBFAC,,, | Performed by: OBSTETRICS & GYNECOLOGY

## 2022-08-08 PROCEDURE — 3008F BODY MASS INDEX DOCD: CPT | Mod: CPTII,S$GLB,, | Performed by: OBSTETRICS & GYNECOLOGY

## 2022-08-08 PROCEDURE — 1160F RVW MEDS BY RX/DR IN RCRD: CPT | Mod: CPTII,S$GLB,, | Performed by: OBSTETRICS & GYNECOLOGY

## 2022-08-08 PROCEDURE — 1159F MED LIST DOCD IN RCRD: CPT | Mod: CPTII,S$GLB,, | Performed by: OBSTETRICS & GYNECOLOGY

## 2022-08-08 PROCEDURE — 1159F PR MEDICATION LIST DOCUMENTED IN MEDICAL RECORD: ICD-10-PCS | Mod: CPTII,S$GLB,, | Performed by: OBSTETRICS & GYNECOLOGY

## 2022-08-08 PROCEDURE — 99204 OFFICE O/P NEW MOD 45 MIN: CPT | Mod: S$GLB,,, | Performed by: OBSTETRICS & GYNECOLOGY

## 2022-08-08 PROCEDURE — 3079F DIAST BP 80-89 MM HG: CPT | Mod: CPTII,S$GLB,, | Performed by: OBSTETRICS & GYNECOLOGY
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
## 2022-08-10 ENCOUNTER — LAB VISIT (OUTPATIENT)
Dept: LAB | Facility: HOSPITAL | Age: 27
End: 2022-08-10
Attending: OBSTETRICS & GYNECOLOGY
Payer: COMMERCIAL

## 2022-08-10 DIAGNOSIS — Z20.2 POSSIBLE EXPOSURE TO STD: ICD-10-CM

## 2022-08-10 LAB
C TRACH DNA SPEC QL NAA+PROBE: NOT DETECTED
N GONORRHOEA DNA SPEC QL NAA+PROBE: NOT DETECTED

## 2022-08-10 PROCEDURE — 86592 SYPHILIS TEST NON-TREP QUAL: CPT | Performed by: OBSTETRICS & GYNECOLOGY

## 2022-08-10 PROCEDURE — 36415 COLL VENOUS BLD VENIPUNCTURE: CPT | Mod: PN | Performed by: OBSTETRICS & GYNECOLOGY

## 2022-08-10 PROCEDURE — 87389 HIV-1 AG W/HIV-1&-2 AB AG IA: CPT | Performed by: OBSTETRICS & GYNECOLOGY

## 2022-08-11 LAB
BACTERIAL VAGINOSIS DNA: POSITIVE
CANDIDA GLABRATA DNA: NEGATIVE
CANDIDA KRUSEI DNA: NEGATIVE
CANDIDA RRNA VAG QL PROBE: NEGATIVE
HIV 1+2 AB+HIV1 P24 AG SERPL QL IA: NEGATIVE
RPR SER QL: NORMAL
T VAGINALIS RRNA GENITAL QL PROBE: NEGATIVE

## 2022-08-12 RX ORDER — METRONIDAZOLE 7.5 MG/G
1 GEL VAGINAL 2 TIMES DAILY
Qty: 1 APPLICATOR | Refills: 0 | Status: SHIPPED | OUTPATIENT
Start: 2022-08-12 | End: 2023-01-17

## 2022-09-01 ENCOUNTER — PATIENT MESSAGE (OUTPATIENT)
Dept: OBSTETRICS AND GYNECOLOGY | Facility: CLINIC | Age: 27
End: 2022-09-01
Payer: COMMERCIAL

## 2022-09-07 ENCOUNTER — OFFICE VISIT (OUTPATIENT)
Dept: OBSTETRICS AND GYNECOLOGY | Facility: CLINIC | Age: 27
End: 2022-09-07
Payer: COMMERCIAL

## 2022-09-07 VITALS — WEIGHT: 175.94 LBS | DIASTOLIC BLOOD PRESSURE: 86 MMHG | SYSTOLIC BLOOD PRESSURE: 118 MMHG | BODY MASS INDEX: 30.2 KG/M2

## 2022-09-07 DIAGNOSIS — N93.9 ABNORMAL UTERINE BLEEDING: ICD-10-CM

## 2022-09-07 DIAGNOSIS — N76.0 ACUTE VAGINITIS: Primary | ICD-10-CM

## 2022-09-07 LAB
B-HCG UR QL: NEGATIVE
CTP QC/QA: YES

## 2022-09-07 PROCEDURE — 3079F PR MOST RECENT DIASTOLIC BLOOD PRESSURE 80-89 MM HG: ICD-10-PCS | Mod: CPTII,S$GLB,, | Performed by: OBSTETRICS & GYNECOLOGY

## 2022-09-07 PROCEDURE — 1160F PR REVIEW ALL MEDS BY PRESCRIBER/CLIN PHARMACIST DOCUMENTED: ICD-10-PCS | Mod: CPTII,S$GLB,, | Performed by: OBSTETRICS & GYNECOLOGY

## 2022-09-07 PROCEDURE — 3008F PR BODY MASS INDEX (BMI) DOCUMENTED: ICD-10-PCS | Mod: CPTII,S$GLB,, | Performed by: OBSTETRICS & GYNECOLOGY

## 2022-09-07 PROCEDURE — 87801 DETECT AGNT MULT DNA AMPLI: CPT | Performed by: OBSTETRICS & GYNECOLOGY

## 2022-09-07 PROCEDURE — 99214 PR OFFICE/OUTPT VISIT, EST, LEVL IV, 30-39 MIN: ICD-10-PCS | Mod: S$GLB,,, | Performed by: OBSTETRICS & GYNECOLOGY

## 2022-09-07 PROCEDURE — 3008F BODY MASS INDEX DOCD: CPT | Mod: CPTII,S$GLB,, | Performed by: OBSTETRICS & GYNECOLOGY

## 2022-09-07 PROCEDURE — 1159F MED LIST DOCD IN RCRD: CPT | Mod: CPTII,S$GLB,, | Performed by: OBSTETRICS & GYNECOLOGY

## 2022-09-07 PROCEDURE — 3074F PR MOST RECENT SYSTOLIC BLOOD PRESSURE < 130 MM HG: ICD-10-PCS | Mod: CPTII,S$GLB,, | Performed by: OBSTETRICS & GYNECOLOGY

## 2022-09-07 PROCEDURE — 81025 POCT URINE PREGNANCY: ICD-10-PCS | Mod: S$GLB,,, | Performed by: OBSTETRICS & GYNECOLOGY

## 2022-09-07 PROCEDURE — 99999 PR PBB SHADOW E&M-EST. PATIENT-LVL III: ICD-10-PCS | Mod: PBBFAC,,, | Performed by: OBSTETRICS & GYNECOLOGY

## 2022-09-07 PROCEDURE — 3074F SYST BP LT 130 MM HG: CPT | Mod: CPTII,S$GLB,, | Performed by: OBSTETRICS & GYNECOLOGY

## 2022-09-07 PROCEDURE — 1159F PR MEDICATION LIST DOCUMENTED IN MEDICAL RECORD: ICD-10-PCS | Mod: CPTII,S$GLB,, | Performed by: OBSTETRICS & GYNECOLOGY

## 2022-09-07 PROCEDURE — 81025 URINE PREGNANCY TEST: CPT | Mod: S$GLB,,, | Performed by: OBSTETRICS & GYNECOLOGY

## 2022-09-07 PROCEDURE — 99999 PR PBB SHADOW E&M-EST. PATIENT-LVL III: CPT | Mod: PBBFAC,,, | Performed by: OBSTETRICS & GYNECOLOGY

## 2022-09-07 PROCEDURE — 1160F RVW MEDS BY RX/DR IN RCRD: CPT | Mod: CPTII,S$GLB,, | Performed by: OBSTETRICS & GYNECOLOGY

## 2022-09-07 PROCEDURE — 87481 CANDIDA DNA AMP PROBE: CPT | Mod: 59 | Performed by: OBSTETRICS & GYNECOLOGY

## 2022-09-07 PROCEDURE — 3079F DIAST BP 80-89 MM HG: CPT | Mod: CPTII,S$GLB,, | Performed by: OBSTETRICS & GYNECOLOGY

## 2022-09-07 PROCEDURE — 99214 OFFICE O/P EST MOD 30 MIN: CPT | Mod: S$GLB,,, | Performed by: OBSTETRICS & GYNECOLOGY

## 2022-09-07 RX ORDER — AMOXICILLIN AND CLAVULANATE POTASSIUM 875; 125 MG/1; MG/1
1 TABLET, FILM COATED ORAL 2 TIMES DAILY
Qty: 28 TABLET | Refills: 0 | Status: SHIPPED | OUTPATIENT
Start: 2022-09-07 | End: 2022-09-21

## 2022-09-07 RX ORDER — NYSTATIN AND TRIAMCINOLONE ACETONIDE 100000; 1 [USP'U]/G; MG/G
CREAM TOPICAL
Qty: 30 G | Refills: 1 | Status: SHIPPED | OUTPATIENT
Start: 2022-09-07 | End: 2023-01-17

## 2022-09-07 NOTE — PROGRESS NOTES
Chief Complaint   Patient presents with    Follow-up       History of Present Illness   27 y.o.    patient presents today for spotting on OCPs, BV treated now having vaginitis symp.    Past medical and surgical history reviewed.   I have reviewed the patient's medical history in detail and updated the computerized patient record.    Review of patient's allergies indicates:  No Known Allergies      Review of Systems -   GEN ROS: negative  Breast ROS: negative for breast lumps  Genito-Urinary ROS: positive for - vulvar/vaginal symptoms      Physical Examination:  /86   Wt 79.8 kg (175 lb 14.8 oz)   LMP 2022 (Approximate)   BMI 30.20 kg/m²      OBGyn Exam   Abd: non tender, no rebound, no guarding, no organomegaly  V,v: no lesions  Cervix: no lesions  Uterus: nssp  Adnexa: no masses, non tender  Assessment:  BTB on OCPs  1. Acute vaginitis        Infected finger    Plan:  Augmentin x 1 week  Mycolog cream  UPT  affirm  BTB on OCPs  Patient informed will be contacted with results within 2 weeks. Encouraged to please call back or email if she has not heard from us by then.

## 2022-09-09 LAB
BACTERIAL VAGINOSIS DNA: NEGATIVE
CANDIDA GLABRATA DNA: NEGATIVE
CANDIDA KRUSEI DNA: NEGATIVE
CANDIDA RRNA VAG QL PROBE: POSITIVE
T VAGINALIS RRNA GENITAL QL PROBE: NEGATIVE

## 2022-09-12 RX ORDER — FLUCONAZOLE 150 MG/1
150 TABLET ORAL DAILY
Qty: 1 TABLET | Refills: 0 | Status: SHIPPED | OUTPATIENT
Start: 2022-09-12 | End: 2022-09-13

## 2022-09-23 ENCOUNTER — PATIENT MESSAGE (OUTPATIENT)
Dept: PSYCHIATRY | Facility: CLINIC | Age: 27
End: 2022-09-23
Payer: COMMERCIAL

## 2022-09-26 ENCOUNTER — PATIENT MESSAGE (OUTPATIENT)
Dept: PSYCHIATRY | Facility: CLINIC | Age: 27
End: 2022-09-26
Payer: COMMERCIAL

## 2022-09-26 ENCOUNTER — OFFICE VISIT (OUTPATIENT)
Dept: PSYCHIATRY | Facility: CLINIC | Age: 27
End: 2022-09-26
Payer: COMMERCIAL

## 2022-09-26 DIAGNOSIS — F41.1 GAD (GENERALIZED ANXIETY DISORDER): ICD-10-CM

## 2022-09-26 DIAGNOSIS — F60.3 BORDERLINE PERSONALITY DISORDER: ICD-10-CM

## 2022-09-26 DIAGNOSIS — F33.1 MODERATE EPISODE OF RECURRENT MAJOR DEPRESSIVE DISORDER: Primary | ICD-10-CM

## 2022-09-26 PROCEDURE — 99214 OFFICE O/P EST MOD 30 MIN: CPT | Mod: 95,,,

## 2022-09-26 PROCEDURE — 1159F MED LIST DOCD IN RCRD: CPT | Mod: CPTII,95,,

## 2022-09-26 PROCEDURE — 1160F PR REVIEW ALL MEDS BY PRESCRIBER/CLIN PHARMACIST DOCUMENTED: ICD-10-PCS | Mod: CPTII,95,,

## 2022-09-26 PROCEDURE — 90833 PR PSYCHOTHERAPY W/PATIENT W/E&M, 30 MIN (ADD ON): ICD-10-PCS | Mod: 95,,,

## 2022-09-26 PROCEDURE — 90833 PSYTX W PT W E/M 30 MIN: CPT | Mod: 95,,,

## 2022-09-26 PROCEDURE — 1159F PR MEDICATION LIST DOCUMENTED IN MEDICAL RECORD: ICD-10-PCS | Mod: CPTII,95,,

## 2022-09-26 PROCEDURE — 99214 PR OFFICE/OUTPT VISIT, EST, LEVL IV, 30-39 MIN: ICD-10-PCS | Mod: 95,,,

## 2022-09-26 PROCEDURE — 1160F RVW MEDS BY RX/DR IN RCRD: CPT | Mod: CPTII,95,,

## 2022-09-26 RX ORDER — LAMOTRIGINE 25 MG/1
TABLET ORAL
Qty: 50 TABLET | Refills: 0 | Status: SHIPPED | OUTPATIENT
Start: 2022-09-26 | End: 2022-10-24

## 2022-09-26 RX ORDER — BUPROPION HYDROCHLORIDE 150 MG/1
150 TABLET ORAL DAILY
Qty: 30 TABLET | Refills: 1 | Status: SHIPPED | OUTPATIENT
Start: 2022-09-26 | End: 2022-10-24

## 2022-09-26 NOTE — PROGRESS NOTES
" Outpatient Psychiatry Follow-Up Visit (MD/NP)    9/26/2022    Clinical Status of Patient:  Outpatient (Virtual)  The patient location is: [address] [place] [address]  The patient phone number is: [phone]   Visit type: Virtual visit with synchronous audio and video  Each patient to whom he or she provides medical services by telemedicine is:  (1) informed of the relationship between the practitioner and patient and the respective role of any other health care provider with respect to management of the patient; and (2) notified that he or she may decline to receive medical services by telemedicine and may withdraw from such care at any time.    Chief Complaint:  Mirella Kline is a 27 y.o. female who presents today for follow-up of depression and anxiety.  Met with patient.      Interval History and Content of Current Session:  Interim Events/Subjective Report/Content of Current Session:   Pt is a 27 y.o. female with past history of anxiety and mood disorder  who presents for follow up treatment. Pt established care with me on 11/4/21, where Pt met criteria for MDD and TAZ. Pt is not currently taking any psychotropic medications.      Pt's last visit was 3/2/22. In the interim she discontinued all medications though she is unsure why.      Drinking since January - July bad    Sober, occ glass of wine             Therapy throught school, October 4th           "Not good"    Off medicine.     Manic not making good decisions      "I feel numb    Evened out symptoms but nothing was making           PSYCHIATRIC REVIEW OF SYMPTOMS  Is patient experiencing or having changes in:    Mood: no  Interest/pleasure/anhedonia: no  Guilt/worthlssness: no  SI: no  Sleep: no  Energy: no  Appetite/weight: no  Concentration/indecisiveness: no  Psychomotor activity: no  S.I.B.s/risky behavior: no    Anxiety: no  Panic attacks: no  Restlessness/'on edge, irritability, muscle tension, avoidance, agoraphobia, social " "phobia,    Flashbacks, nightmares, dissociative episodes    Recurrent thoughts, recurrent behaviors          HYPOMANIA SCREEN      A. A distinct period of abnormally and persistently elevated, expansive, or irritable mood and abnormally and persistently increased activity or energy, lasting at least four consecutive days and present most of the day, nearly every day.    B. During the period of mood disturbance and increased energy and activity, three (or more) of the following symptoms (four if the mood is only irritable) have persisted, represent a noticeable change from usual behavior, and have been present to a significant degree:    1) Inflated self-esteem or grandiosity. Attention at bar makes me feel good  2) Decreased need for sleep (eg, feels rested after only three hours of sleep): ***  3) More talkative than usual or pressure to keep talking. It depends  4) Flight of ideas or subjective experience that thoughts are racing. More than usual lately  5) Distractibility **  6) Increase in goal-directed activity (either socially, at work or school, or sexually) or psychomotor agitation (ie, purposeless non-goal-directed activity). ***  7) Excessive involvement in activities that have a high potential for painful consequences  (unrestrained buying sprees, sexual indiscretions, or foolish business investments). ***    C. The episode is associated with an unequivocal change in functioning that is uncharacteristic of the individual when not symptomatic.  D. The disturbance in mood and the change in functioning are observable by others.  E. The episode is not severe enough to cause marked impairment in social or occupational functioning or to necessitate hospitalization. If there are psychotic features, the episode is, by definition, manic.        Endorses a pervasive pattern of   instability of interpersonal relationships - "I feel like I play victim a lot" "I can be selfish when I'm triggered."   self image - " "  affect with marked impulsivity beginning in *** with frantic efforts to avoid abandonment,  unstable and intense interpersonal relationships,   unstable self image -   impulsivity with spending and sexual behavior,   h/o cutting in college ***,   marked reactivity of mood with daily transition from dysphoria to irritability to anxiety that lasts minutes to hours but never more than one day,   chronic feelings of emptiness,   inappropriate anger and difficulty controlling anger -  yes        A pervasive pattern of instability of interpersonal relationships, self-image, and affects, and marked impulsivity, beginning by early adulthood and present in a variety of contexts, as indicated by five (or more) of the followin. Frantic efforts to avoid real or imagined abandonment - yes, (cheating on him) 6 years,     2. A pattern of unstable and intense interpersonal relationships characterized by alternating between extremes of idealization and devaluation.    3. Identity disturbance: markedly and persistently unstable self-image or sense of self. "I feel like my personality changes depending on my situation"    4. Impulsivity in at least two areas that are potentially self-damaging (eg, spending, sex, substance abuse, reckless driving, binge eating). (Note: Do not include suicidal or self-mutilating behavior covered in Criterion 5.) hooking up with people, drinking, spending money,  hair pink    5. Recurrent suicidal behavior, gestures, or threats, or self-mutilating behavior - suicidal thoughts fleeting,     6. Affective instability due to a marked reactivity of mood (eg, intense episodic dysphoria, irritability, or anxiety usually lasting a few hours and only rarely more than a few days).    7. Chronic feelings of emptiness - yes    8. Inappropriate, intense anger or difficulty controlling anger (eg, frequent displays of temper, constant anger, recurrent physical fights). yes    9. Transient, stress-related " "paranoid ideation or severe dissociative symptoms - denies      Energetic  But anxiety  Crying spells, focusing on it for hours      Psychiatric symptoms:  Depression:    Mood: no  Interest/pleasure/anhedonia: +  Guilt/worthlssness:  +  SI: no  Sleep: ok, 6-7 hours  Energy: fatigued, even when I get sleep it doesn't do anything for me   Appetite/weight: normal, wt loss of 5 lbs  Concentration/indecisiveness: decreased  Decreased motivation - hasnt gone to school,   Psychomotor activity: no  S.I.B.s/risky behavior: no  Anxiety:    Anxiety: denies  Panic attacks, restlessness/'on edge', irritability, muscle tension, avoidance, agoraphobia, social phobia  Sallie/Hypomania:  Pt denies periods of elevated mood or abnormally increased energy or goal-directed activity. Denies distractibility, indiscretion, grandiosity, racing thoughts, increased activity, reduced need for sleep, increased talkativeness         Drinking   Not going to school      Distractibility-  Impulsivity-   Grandiosity-   Flight of ideas-   Activity-   Sleep-  Talk-      Thoughts:  Denied delusions, hallucinations, paranoia  Suicidal thoughts/behaviors: Patient denied suicidal and homicidal ideation, plan and intent.  Patient noted agreement to call 911/and or present to the ED if she experienced suicidal or homicidal ideation, plan or intent.    Self-injury:  Patient denies.  Sleep: {Insomnia Sx:[id number]}  Trauma:                     3/2/22: Today patient reports her mood is "good."  She denies depressed mood, crying spells, anxiety, or irritability.  And states I am doing really good overall.  She notes improvement in quantity and quality of sleep after starting trazodone.  She notes that although her mood has improved she still has my depressive habits reports that she has to put effort into getting up in the morning and taking care of herself though she reiterates that she is no longer having feelings of sadness.  She states she has  a lot " "going on with school" but needs to grow out of my depressive habits.  Encouraged individual psychotherapy which patient verbalizes interest in.    Initial HPI: Gretchen is a 26 y.o. female, with a past psychiatric hx of anxiety and mood disorder presenting to the clinic for an initial evaluation and treatment. Past medical history detailed below. She reports she has struggled with depression and anxiety for "years." She was seen by child psychiatrist Dariusz Jimenez MD from [id number] during which time she was initially diagnosed with bipolar disorder, but she reports this diagnosis was later changed to mood disorder. She denies ever having symptoms of shan including grandiosity, decreased need for sleep, excessive talking, distractibility, increase in goal-directed activity, or recklessness. She reports that her mood is depressed at baseline and she occasionally has periods where it is lower than normal. She reports a period of approximately 2 months after the birth of her son in October of 2020 during which she experienced markedly depressed mood and anxiety. She did not seek treatment at that time; she has not sought treatment since terminating with Dr. Jimenez in 2014. She is not currently taking any medications. At this time she reports symptoms of depressed mood, anhedonia, fatigue, feelings of guilt and worthlessness, fatigue, and concentration problems. She also reports excessive anxiety and worry which she has difficulty controlling, restlessness, irritability, and headaches, as well as anxiety around groups of people, including classes at school and the grocery store, and dwelling on conversations she has had. She also reports frequent negative thinking and worst case scenario thinking. These symptoms have been consistent for "years." She reports she is under a great deal of stress related to her relationship with her son's father. She further states she would like pharmacological treatment for her " "depressed mood and anxiety. She states she does not wish to take SSRI's as she did not like the way they made her feel. She states she has done well on Lamictal in the past and would like to try Lamictal again.     Initial Psych ROS:  Depression: no appetite/weight changes, insomnia/hypersomnia, difficulty concentrating, or recurrent thoughts of death or SI  Sleep: Sleeps 8  hours a night on average. Wakes up fatigued.  Sallie: No episodes of expansive mood, decreased need for sleep, increased goal directed behaviors, or racing thoughts  Anxiety: no panic attacks, agoraphobia, phobias, avoidance, or somatic related complaints   OCD: no obsessions or compulsive behaviors   PTSD: no flashbacks, nightmares, or avoidance of stimuli  Psychosis: no current A/V hallucinations or delusions - She does report feeling as though she "was being haunted by things in closet, I would talk to it," but only after beginning an unknown multidrug regimen in 2013  SI/HI - passive SI, reports fleeting thoughts such as "What it I  wasn't here?" However she states, "I would never do that." Denies a plan or suicidal intent. Reports she has her son to live for. Agrees to call the clinic or 911 if she develops SI with intent or plan  Access to guns: denies       Past Psychiatric History:  Past Psych Hx: anxiety and mood disorder  Dr Jimenez in 2013  First psych contact: inpatient psych admission at [facility] in 2013 after attempted suicide by unknown means, possible overdose     Prior hospitalizations: 2-3 times at [facility] for suicide attempts; she reports she has difficulty remembering this part of her life and cannot remember the method of theses attempts "maybe pills"  Prior suicide attempts or self-harm: 2-3, see above; denies cutting  Prior diagnosis: bipolar disorder, social phobia  Prior meds: Prozac, Lamictal, other unknown medications  Current meds: none  Prior psychotherapy: 1 x month with  for last " year      Past Medical Hx:   Past Medical History:   Diagnosis Date    History of psychiatric care     History of psychiatric hospitalization     Self-harming behavior     Suicide attempt    Hx of TBI: no      Hx of seizures: no        Past Surgical Hx:        Past Surgical History:   Procedure Laterality Date    ADENOIDECTOMY        TONSILLECTOMY             Family Hx:         Family History   Problem Relation Age of Onset    Diabetes Paternal Grandmother      Bipolar disorder Neg Hx      Schizophrenia Neg Hx        Paternal: father hx of ADHD; several family members with alcohol use disorder   Maternal: mother - undiagnosed but anxious, irritable  Siblings: 1 sister with similar symptoms of anxiety      Social Hx:   Social History              Socioeconomic History    Marital status: Single   Tobacco Use    Smoking status: Former Smoker    Smokeless tobacco: Never Used   Substance and Sexual Activity    Alcohol use: Yes   Other Topics Concern    Childhood History: Raised by parents Yes    Education: High School Graduate Yes         Childhood: Born in [place], raised in [place]  Marital Status: never   Children: 1 year old sonDevante  Resides: [place]  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[employer]; student  Hobbies: none currently, she reports she is trying to read again, however between her 1 year old and school, she does not have much time for herself  Denominational:  denies  Education level: some college, currently attending [employer] majoring in social work, expected graduation date 2023  : denies   Legal: denies     Substance Hx:  Caffeine: Coffee 3-4 cups per coffee/day if she is at work or school  Tobacco:  former smoker, quit in Jan 2020  Alcohol: occasional, approximately once every few months, 2-3 drinks per occasion  Drug use: no  Rehab: no  Prior/current AA: no    Interim hx:     Medication changes last visit: 3/2/22  ContinueLamictal 200 mg po daily for mood  Continue Wellbutrin   mg po q.a.m.  to target anxiety and mood  Continue hydroxyzine 25 mg po TID PRN anxiety  Continue trazodone  mg po q.h.s. PRN insomnia    1/31/22:  Continue Lamictal 200 mg po daily for mood  Increase Wellbutrin  mg q.a.m. to 300 mg po q.a.m.  to target anxiety and mood  Continue hydroxyzine 25 mg po TID PRN anxiety  Start trazodone  mg po q.h.s. PRN insomnia    Medication changes on 12/2  Increase Lamictal to 200 mg po daily to target irritability and concentration deficit  Start Wellbutrin  mg q.a.m. to target anxiety and irritability  Start hydroxyzine 25 mg po TID PRN anxiety        Alcohol/Drugs/Caffeine/Tobacco: No change in consumption    Review of Systems   PSYCHIATRIC: Pertinant items are noted in the narrative.  All other systems reviewed and found to be negative    Past Medical, Family and Social History: The patient's past medical, family and social history have been reviewed and updated as appropriate within the electronic medical record - see encounter notes.    Adherence: yes    Side effects: None    Risk Parameters:  Patient reports no suicidal ideation  Patient reports no homicidal ideation  Patient reports no self-injurious behavior  Patient reports no violent behavior    Exam   Constitutional  Vitals:  Most recent vital signs, dated greater than 90 days prior to this appointment, were reviewed.   Last 3 sets of Vitals    Vitals - 1 value per visit 8/8/2022 9/7/2022 9/7/2022   SYSTOLIC 120 - 118   DIASTOLIC 80 - 86   Pulse - - -   Temp - - -   Resp - - -   SPO2 - - -   Weight (lb) 174.38 - 175.93   Weight (kg) 79.1 - 79.8   Height - - -   BMI (Calculated) - - -   VISIT REPORT - - -   Pain Score  - 0 -          General:  unremarkable, age appropriate, normal weight, well nourished, casually dressed     Musculoskeletal  Muscle Strength/Tone:  Unable to assess due to nature of virtual visit   Gait & Station:  Unable to assess due to nature of virtual visit      Psychiatric  Speech:  no latency; no press   Mood & Affect:  happy  congruent and appropriate   Thought Process:  normal and logical   Associations:  intact   Thought Content:  normal, no suicidality, no homicidality, delusions, or paranoia   Insight:  intact   Judgement: behavior is adequate to circumstances   Orientation:  grossly intact   Memory: intact for content of interview   Language: grossly intact   Attention Span & Concentration:  able to focus   Fund of Knowledge:  intact and appropriate to age and level of education     Suicide Risk Assessment:  Protective factors: age, gender, no ongoing substance abuse, no psychosis, has a son, denies active SI/intent/plan, seeking treatment, access to treatment, future oriented, good primary support, no access to firearms  Risks: 2-3 prior attempts, 2-3 prior hospitalizations,  Patient is a low immediate and long-term risk considering risk factors. Patient denied suicidal or homicidal ideation, plan, or intent.  Patient noted agreement to call 911 and/or present to the nearest emergency department if Pt develops suicidal or homicidal ideation, plan, or intent.      Assessment and Diagnosis   Status/Progress: Based on the examination today, the patient's problem(s) is/are improved.  New problems have not been presented today.   Co-morbidities are not complicating management of the primary condition.  The working differential for this patient includes bipolar disorder, mood disorder NOS, social anxiety, intermittent explosive disorder.     General Impression: Pt is a 27 y.o. female with past history of anxiety and mood disorder presenting today for a follow up visit with continued improvement in symptoms. She denies racing thoughts, increased distractibility, increase in goal directed activity. She denies elevated or expansive mood, inflated self-esteem or grandiosity, flight of ideas, or excessive involvement in activities that have a high potential for painful  consequences.     Per shared decision making with the Pt, medications will be continued at current doses as she has achieved early remission.  Referral for individual psychotherapy will be placed per patient request.     Safe for outpatient follow up and no acute safety concerns.    No diagnosis found.      Intervention/Counseling/Treatment Plan   Medication Management: The risks and benefits of medication were discussed with the patient. Shared decision making occurred   The treatment plan and follow up plan were reviewed with the patient.   ContinueLamictal 200 mg po daily for mood  Continue Wellbutrin  mg po q.a.m.  to target anxiety and mood  Continue hydroxyzine 25 mg po TID PRN anxiety  Continue trazodone  mg po q.h.s. PRN insomnia  Referral for individual psychotherapy placed to Amanda Warner LCSW   Call to report any worsening of symptoms or problems with the medication. Pt instructed to go to ER with thoughts of harming self, others  Labs: no new orders      Psychotherapy:   Target symptoms: depression, anxiety   Outcome monitoring methods: self-report, observation  Therapeutic Intervention Type: supportive psychotherapy  Why chosen therapy is appropriate versus another modality: relevant to diagnosis, patient responds to this modality  Patient's response to intervention:The patient's response to intervention is accepting.  Progress toward goals: The patient's progress toward goals is good.  Topics discussed/themes: building skills sets for symptom management, symptom recognition, nutrition, exercise  Duration of intervention: 16 minutes    Return to Clinic: 1 month    -Pt given contact number for psychotherapists at [facility] ans also instructed they may check with insurance for a list of providers  -Spent 30min face to face with the pt; >50% time spent in counseling   -Supportive therapy and psychoeducation provided  -R/B/SE's of medications discussed with the pt who expresses  understanding and chooses to take medications as prescribed.   -Pt instructed to call clinic, 911 or go to nearest emergency room if sxs worsen or pt is in   crisis. The pt expresses understanding.    EVI Turner, PMHNP-BC  Department of Psychiatry - [facility] [address]  [place] [address]  Office: [phone]  Fax: [phone]

## 2022-09-26 NOTE — PROGRESS NOTES
Outpatient Psychiatry Follow-Up Visit (MD/NP)    9/26/2022    Clinical Status of Patient:  Outpatient (Virtual)  The patient location is: [address] [place] [address]  The patient phone number is: [phone]   Visit type: Virtual visit with synchronous audio and video  Each patient to whom he or she provides medical services by telemedicine is:  (1) informed of the relationship between the practitioner and patient and the respective role of any other health care provider with respect to management of the patient; and (2) notified that he or she may decline to receive medical services by telemedicine and may withdraw from such care at any time.    Chief Complaint:  Mirella Kline is a 27 y.o. female who presents today for follow-up of depression and anxiety.  Met with patient.      Interval History and Content of Current Session:  Interim Events/Subjective Report/Content of Current Session:   Pt is a 27 y.o. female with past history of anxiety and mood disorder  who presents for follow up treatment. Pt established care with me on 11/4/21, where Pt met criteria for MDD and TAZ. Pt is not currently taking any psychotropic medications.      Pt's last visit was 3/2/22. In the interim she discontinued all medications after an increase in etoh intake beginning approx 9 months ago that peaked 2 months ago. Pt notes she has decreased etoh intake since that time and now only occasionally has a drink. Pt reports she would like a re-evaluation of previous diagnoses and notes she has research bipolar disorder and borderline personality disorder.  She feels like her symptoms represent borderline personality disorder.  She does note she was starting individual psychotherapy through her school on October 4th.    HYPOMANIA SCREEN      A. A distinct period of abnormally and persistently elevated, expansive, or irritable mood and abnormally and persistently increased activity or energy, lasting at least four  "consecutive days and present most of the day, nearly every day. Denies elevated mood lasting more than 1 day    B. During the period of mood disturbance and increased energy and activity, three (or more) of the following symptoms (four if the mood is only irritable) have persisted, represent a noticeable change from usual behavior, and have been present to a significant degree:    1) Inflated self-esteem or grandiosity. "Attention at the bar makes me feel good"  2) Decreased need for sleep (eg, feels rested after only three hours of sleep): denies  3) More talkative than usual or pressure to keep talking. "It depends"  4) Flight of ideas or subjective experience that thoughts are racing. More than usual lately  5) Distractibility "Always"  6) Increase in goal-directed activity (either socially, at work or school, or sexually) or psychomotor agitation (ie, purposeless non-goal-directed activity). denies  7) Excessive involvement in activities that have a high potential for painful consequences  (unrestrained buying sprees, sexual indiscretions, or foolish business investments). Drinking, buying sprees "not bad, but more than I should have been spending", recently impulsively dyed hair pink        BORDERLINE PERSONALITY DISORDER SCREEN  A pervasive pattern of instability of interpersonal relationships, self-image, and affects, and marked impulsivity, beginning by early adulthood and present in a variety of contexts, as indicated by five (or more) of the followin. Frantic efforts to avoid real or imagined abandonment - endorses, recently  from partner of 6 years and the father of her child  2. A pattern of unstable and intense interpersonal relationships characterized by alternating between extremes of idealization and devaluation. Endorses "I feel like I play victim a lot" "I can be selfish when I'm triggered."   3. Identity disturbance: markedly and persistently unstable self-image or sense of self. " "Endorses "I feel like my personality changes depending on my situation"  4. Impulsivity in at least two areas that are potentially self-damaging (eg, spending, sex, substance abuse, reckless driving, binge eating). (Note: Do not include suicidal or self-mutilating behavior covered in Criterion 5.) "hooking up with people" (cheated on partner of 6 years), drinking, spending money, dyed hair pink, not attending school recently  5. Recurrent suicidal behavior, gestures, or threats, or self-mutilating behavior - "fleeting" suicidal thoughts  6. Affective instability due to a marked reactivity of mood (eg, intense episodic dysphoria, irritability, or anxiety usually lasting a few hours and only rarely more than a few days). Endorses - reports rapid mood swings, crying spells, "focusing on things for hours - that's all that matters at the time"  7. Chronic feelings of emptiness - endorses  8. Inappropriate, intense anger or difficulty controlling anger (eg, frequent displays of temper, constant anger, recurrent physical fights). endorses  9. Transient, stress-related paranoid ideation or severe dissociative symptoms - denies      Depression:    Mood: +depressed mood  Interest/pleasure/anhedonia: +  Guilt/worthlssness:  +  SI: no  Sleep: "ok", 6-7 hours  Energy: fatigued, "even when I get sleep it doesn't do anything for me"   Appetite/weight: normal appetite, but recent wt loss of 5 lbs  Concentration/indecisiveness: decreased  Decreased motivation - hasnt gone to school   Psychomotor activity: no  S.I.B.s/risky behavior: no    3/2/22: Today patient reports her mood is "good."  She denies depressed mood, crying spells, anxiety, or irritability.  And states I am doing really good overall.  She notes improvement in quantity and quality of sleep after starting trazodone.  She notes that although her mood has improved she still has my depressive habits reports that she has to put effort into getting up in the morning and " "taking care of herself though she reiterates that she is no longer having feelings of sadness.  She states she has  a lot going on with school" but needs to grow out of my depressive habits.  Encouraged individual psychotherapy which patient verbalizes interest in.    Initial HPI: Gretchen is a 26 y.o. female, with a past psychiatric hx of anxiety and mood disorder presenting to the clinic for an initial evaluation and treatment. Past medical history detailed below. She reports she has struggled with depression and anxiety for "years." She was seen by child psychiatrist Dariusz Jimenez MD from [id number] during which time she was initially diagnosed with bipolar disorder, but she reports this diagnosis was later changed to mood disorder. She denies ever having symptoms of shan including grandiosity, decreased need for sleep, excessive talking, distractibility, increase in goal-directed activity, or recklessness. She reports that her mood is depressed at baseline and she occasionally has periods where it is lower than normal. She reports a period of approximately 2 months after the birth of her son in October of 2020 during which she experienced markedly depressed mood and anxiety. She did not seek treatment at that time; she has not sought treatment since terminating with Dr. Jimenez in 2014. She is not currently taking any medications. At this time she reports symptoms of depressed mood, anhedonia, fatigue, feelings of guilt and worthlessness, fatigue, and concentration problems. She also reports excessive anxiety and worry which she has difficulty controlling, restlessness, irritability, and headaches, as well as anxiety around groups of people, including classes at school and the grocery store, and dwelling on conversations she has had. She also reports frequent negative thinking and worst case scenario thinking. These symptoms have been consistent for "years." She reports she is under a great deal of stress " "related to her relationship with her son's father. She further states she would like pharmacological treatment for her depressed mood and anxiety. She states she does not wish to take SSRI's as she did not like the way they made her feel. She states she has done well on Lamictal in the past and would like to try Lamictal again.     Initial Psych ROS:  Depression: no appetite/weight changes, insomnia/hypersomnia, difficulty concentrating, or recurrent thoughts of death or SI  Sleep: Sleeps 8  hours a night on average. Wakes up fatigued.  Sallie: No episodes of expansive mood, decreased need for sleep, increased goal directed behaviors, or racing thoughts  Anxiety: no panic attacks, agoraphobia, phobias, avoidance, or somatic related complaints   OCD: no obsessions or compulsive behaviors   PTSD: no flashbacks, nightmares, or avoidance of stimuli  Psychosis: no current A/V hallucinations or delusions - She does report feeling as though she "was being haunted by things in closet, I would talk to it," but only after beginning an unknown multidrug regimen in 2013  SI/HI - passive SI, reports fleeting thoughts such as "What it I  wasn't here?" However she states, "I would never do that." Denies a plan or suicidal intent. Reports she has her son to live for. Agrees to call the clinic or 911 if she develops SI with intent or plan  Access to guns: denies       Past Psychiatric History:  Past Psych Hx: anxiety and mood disorder  Dr Jimenez in 2013  First psych contact: inpatient psych admission at [facility] in 2013 after attempted suicide by unknown means, possible overdose     Prior hospitalizations: 2-3 times at [facility] for suicide attempts; she reports she has difficulty remembering this part of her life and cannot remember the method of theses attempts "maybe pills"  Prior suicide attempts or self-harm: 2-3, see above; denies cutting  Prior diagnosis: bipolar disorder, social phobia  Prior meds: " Prozac, Lamictal, other unknown medications  Current meds: none  Prior psychotherapy: 1 x month with  for last year      Past Medical Hx:   Past Medical History:   Diagnosis Date    History of psychiatric care     History of psychiatric hospitalization     Self-harming behavior     Suicide attempt    Hx of TBI: no      Hx of seizures: no        Past Surgical Hx:        Past Surgical History:   Procedure Laterality Date    ADENOIDECTOMY        TONSILLECTOMY             Family Hx:         Family History   Problem Relation Age of Onset    Diabetes Paternal Grandmother      Bipolar disorder Neg Hx      Schizophrenia Neg Hx        Paternal: father hx of ADHD; several family members with alcohol use disorder   Maternal: mother - undiagnosed but anxious, irritable  Siblings: 1 sister with similar symptoms of anxiety      Social Hx:   Childhood: Born in [place], raised in [place]  Marital Status: never   Children: 1 year old sonDevante  Resides: [place]  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[employer]; student  Hobbies: none currently, she reports she is trying to read again, however between her 1 year old and school, she does not have much time for herself  Jew:  denies  Education level: some college, currently attending [employer] majoring in social work, expected graduation date 2023  : denies   Legal: denies     Substance Hx:  Caffeine: Coffee 3-4 cups per coffee/day if she is at work or school  Tobacco:  former smoker, quit in Jan 2020  Alcohol: occasional, approximately once every few months, 2-3 drinks per occasion  Drug use: no  Rehab: no  Prior/current AA: no    Interim hx:     Medication changes last visit: 3/2/22  ContinueLamictal 200 mg po daily for mood  Continue Wellbutrin  mg po q.a.m.  to target anxiety and mood  Continue hydroxyzine 25 mg po TID PRN anxiety  Continue trazodone  mg po q.h.s. PRN insomnia    1/31/22:  Continue Lamictal 200 mg po daily for  mood  Increase Wellbutrin  mg q.a.m. to 300 mg po q.a.m.  to target anxiety and mood  Continue hydroxyzine 25 mg po TID PRN anxiety  Start trazodone  mg po q.h.s. PRN insomnia    Medication changes on 12/2  Increase Lamictal to 200 mg po daily to target irritability and concentration deficit  Start Wellbutrin  mg q.a.m. to target anxiety and irritability  Start hydroxyzine 25 mg po TID PRN anxiety        Alcohol/Drugs/Caffeine/Tobacco: No change in consumption    Review of Systems   PSYCHIATRIC: Pertinant items are noted in the narrative.  All other systems reviewed and found to be negative    Past Medical, Family and Social History: The patient's past medical, family and social history have been reviewed and updated as appropriate within the electronic medical record - see encounter notes.    Adherence: yes    Side effects: None    Risk Parameters:  Patient reports no suicidal ideation  Patient reports no homicidal ideation  Patient reports no self-injurious behavior  Patient reports no violent behavior    Exam   Constitutional  Vitals:  Most recent vital signs, dated greater than 90 days prior to this appointment, were reviewed.   Last 3 sets of Vitals    Vitals - 1 value per visit 8/8/2022 9/7/2022 9/7/2022   SYSTOLIC 120 - 118   DIASTOLIC 80 - 86   Pulse - - -   Temp - - -   Resp - - -   SPO2 - - -   Weight (lb) 174.38 - 175.93   Weight (kg) 79.1 - 79.8   Height - - -   BMI (Calculated) - - -   VISIT REPORT - - -   Pain Score  - 0 -          General:  unremarkable, age appropriate, normal weight, well nourished, casually dressed     Musculoskeletal  Muscle Strength/Tone:  Unable to assess due to nature of virtual visit   Gait & Station:  Unable to assess due to nature of virtual visit     Psychiatric  Speech:  no latency; no press   Mood & Affect:  anxious, depressed  congruent and appropriate   Thought Process:  normal and logical   Associations:  intact   Thought Content:  normal, no  suicidality, no homicidality, delusions, or paranoia   Insight:  intact   Judgement: behavior is adequate to circumstances   Orientation:  grossly intact   Memory: intact for content of interview   Language: grossly intact   Attention Span & Concentration:  able to focus   Fund of Knowledge:  intact and appropriate to age and level of education     Suicide Risk Assessment:  Protective factors: age, gender, no ongoing substance abuse, no psychosis, has a son, denies active SI/intent/plan, seeking treatment, access to treatment, future oriented, good primary support, no access to firearms  Risks: 2-3 prior attempts, 2-3 prior hospitalizations, ongoing depression and anxiety  Patient is a low immediate and long-term risk considering risk factors. Patient denied suicidal or homicidal ideation, plan, or intent.  Patient noted agreement to call 911 and/or present to the nearest emergency department if Pt develops suicidal or homicidal ideation, plan, or intent.      Assessment and Diagnosis   Status/Progress: Based on the examination today, the patient's problem(s) is/are improved.  New problems have not been presented today.   Co-morbidities are not complicating management of the primary condition.  The working differential for this patient includes bipolar disorder, mood disorder NOS, social anxiety, intermittent explosive disorder.     General Impression: Pt is a 27 y.o. female with past history of anxiety and mood disorder presenting today for a follow up visit.  Patient reports she discontinued all psychotropic medications after our last visit and has been drinking in excess.  Today, patient endorses multiple symptoms of borderline personality disorder.  As Lamictal and Wellbutrin provided good relief of symptoms previously we will restart these medications today.  Discussed dialectical behavior therapy as the gold standard treatment for borderline personality disorder with patient who verbalized understanding and  agrees to treatment plan.    Safe for outpatient follow up and no acute safety concerns.    Encounter Diagnoses   Name Primary?    Moderate episode of recurrent major depressive disorder Yes    Borderline personality disorder     TAZ (generalized anxiety disorder)        Intervention/Counseling/Treatment Plan   Medication Management: The risks and benefits of medication were discussed with the patient. Shared decision making occurred   The treatment plan and follow up plan were reviewed with the patient.   Start typical titration of Lamictal for mood, 25 mg p.o. daily times 14 days, then 50 mg p.o. daily times 14 days, then 100 mg p.o. daily times 14 days, then 200 mg p.o. daily  Start Wellbutrin  mg po q.a.m. for anxiety and mood  Referral placed for dialectical behavior therapy  Recommend keeping appointment for individual psychotherapy through school; offered referral for individual psychotherapy with in house therapists, patient declined  Call to report any worsening of symptoms or problems with the medication. Pt instructed to go to ER with thoughts of harming self, others  Labs: no new orders      Psychotherapy:   Target symptoms: depression, anxiety   Outcome monitoring methods: self-report, observation  Therapeutic Intervention Type: supportive psychotherapy  Why chosen therapy is appropriate versus another modality: relevant to diagnosis, patient responds to this modality  Patient's response to intervention:The patient's response to intervention is accepting.  Progress toward goals: The patient's progress toward goals is good.  Topics discussed/themes: building skills sets for symptom management, symptom recognition, nutrition, exercise  Duration of intervention: 18 minutes    Return to Clinic: 1 month    -Pt given contact number for psychotherapists at [facility] ans also instructed they may check with insurance for a list of providers  -Spent 30min face to face with the pt; >50% time spent in  counseling   -Supportive therapy and psychoeducation provided  -R/B/SE's of medications discussed with the pt who expresses understanding and chooses to take medications as prescribed.   -Pt instructed to call clinic, 911 or go to nearest emergency room if sxs worsen or pt is in   crisis. The pt expresses understanding.    EVI Turner, PMHNP-BC  Department of Psychiatry - [facility] [address]  [place] [address]  Office: [phone]  Fax: [phone]

## 2022-10-21 ENCOUNTER — PATIENT MESSAGE (OUTPATIENT)
Dept: PSYCHIATRY | Facility: CLINIC | Age: 27
End: 2022-10-21
Payer: COMMERCIAL

## 2022-10-24 ENCOUNTER — PATIENT MESSAGE (OUTPATIENT)
Dept: PSYCHIATRY | Facility: CLINIC | Age: 27
End: 2022-10-24
Payer: COMMERCIAL

## 2022-10-24 ENCOUNTER — OFFICE VISIT (OUTPATIENT)
Dept: PSYCHIATRY | Facility: CLINIC | Age: 27
End: 2022-10-24
Payer: COMMERCIAL

## 2022-10-24 DIAGNOSIS — F41.1 GAD (GENERALIZED ANXIETY DISORDER): ICD-10-CM

## 2022-10-24 DIAGNOSIS — F33.1 MODERATE EPISODE OF RECURRENT MAJOR DEPRESSIVE DISORDER: Primary | ICD-10-CM

## 2022-10-24 DIAGNOSIS — F60.3 BORDERLINE PERSONALITY DISORDER: ICD-10-CM

## 2022-10-24 PROCEDURE — 1160F RVW MEDS BY RX/DR IN RCRD: CPT | Mod: CPTII,95,,

## 2022-10-24 PROCEDURE — 99214 OFFICE O/P EST MOD 30 MIN: CPT | Mod: 95,,,

## 2022-10-24 PROCEDURE — 1159F PR MEDICATION LIST DOCUMENTED IN MEDICAL RECORD: ICD-10-PCS | Mod: CPTII,95,,

## 2022-10-24 PROCEDURE — 99214 PR OFFICE/OUTPT VISIT, EST, LEVL IV, 30-39 MIN: ICD-10-PCS | Mod: 95,,,

## 2022-10-24 PROCEDURE — 1159F MED LIST DOCD IN RCRD: CPT | Mod: CPTII,95,,

## 2022-10-24 PROCEDURE — 1160F PR REVIEW ALL MEDS BY PRESCRIBER/CLIN PHARMACIST DOCUMENTED: ICD-10-PCS | Mod: CPTII,95,,

## 2022-10-24 RX ORDER — BUPROPION HYDROCHLORIDE 300 MG/1
300 TABLET ORAL DAILY
Qty: 90 TABLET | Refills: 0 | Status: SHIPPED | OUTPATIENT
Start: 2022-10-24 | End: 2023-01-17 | Stop reason: SDUPTHER

## 2022-10-24 RX ORDER — LAMOTRIGINE 100 MG/1
TABLET ORAL
Qty: 48 TABLET | Refills: 0 | Status: SHIPPED | OUTPATIENT
Start: 2022-10-24 | End: 2022-11-21

## 2022-10-24 NOTE — PROGRESS NOTES
" Outpatient Psychiatry Follow-Up Visit (MD/NP)    10/24/2022    Clinical Status of Patient:  Outpatient (Virtual)  The patient location is: [address] [place] [address]  The patient phone number is: [phone]   Visit type: Virtual visit with synchronous audio and video  Each patient to whom he or she provides medical services by telemedicine is:  (1) informed of the relationship between the practitioner and patient and the respective role of any other health care provider with respect to management of the patient; and (2) notified that he or she may decline to receive medical services by telemedicine and may withdraw from such care at any time.    Chief Complaint:  Mirella Kline is a 27 y.o. female who presents today for follow-up of depression and anxiety.  Met with patient.      Interval History and Content of Current Session:  Interim Events/Subjective Report/Content of Current Session:   Pt is a 27 y.o. female with history of MDD, TAZ, borderline personality disorder who presents for follow up treatment. Pt established care with me on 11/4/21, where Pt met criteria for MDD and TAZ. Pt is currently taking typical titration of lamotrigine, currently on 50 mg daily dose, and Wellbutrin  mg po q.a.m. Pt has had previous trials of Prozac (ineffective), Lamictal (effective), Wellbutrin XL (effective), hydroxyzine, and trazodone.    Patient reports no difficulty restarting medication over the interim and denies side effects.  She reports improvement in mood and motivation and has been able to attend school.  She does endorse a situational component to her depression and states, "I'm not sad because I don't have a reason.  I'm sad because of my family situation.  She has maintained sobriety over the interim.  She denies suicidal ideation.      Psych ROS:    Mood: "better overall"  Interest/pleasure/anhedonia: improving  Guilt/worthlssness:  + "Yes but situational"  SI: no  Sleep: "a little " "better"  Energy: "OK" has been drinking energy drinks  Appetite/weight: normal appetite, but recent wt loss of 5 lbs - was 174 last night 159 but has been trying ot lose weight  Concentration/indecisiveness: better, still procrastinating, but can complete homework  Motivation - improving   Psychomotor activity: no  S.I.B.s/risky behavior: no  Anxiety: decreased       9/26/2022: Pt's last visit was 3/2/22. In the interim she discontinued all medications after an increase in etoh intake beginning approx 9 months ago that peaked 2 months ago. Pt notes she has decreased etoh intake since that time and now only occasionally has a drink. Pt reports she would like a re-evaluation of previous diagnoses and notes she has research bipolar disorder and borderline personality disorder.  She feels like her symptoms represent borderline personality disorder.  She does note she was starting individual psychotherapy through her school on October 4th.    HYPOMANIA SCREEN      A. A distinct period of abnormally and persistently elevated, expansive, or irritable mood and abnormally and persistently increased activity or energy, lasting at least four consecutive days and present most of the day, nearly every day. Denies elevated mood lasting more than 1 day    B. During the period of mood disturbance and increased energy and activity, three (or more) of the following symptoms (four if the mood is only irritable) have persisted, represent a noticeable change from usual behavior, and have been present to a significant degree:    1) Inflated self-esteem or grandiosity. "Attention at the bar makes me feel good"  2) Decreased need for sleep (eg, feels rested after only three hours of sleep): denies  3) More talkative than usual or pressure to keep talking. "It depends"  4) Flight of ideas or subjective experience that thoughts are racing. More than usual lately  5) Distractibility "Always"  6) Increase in goal-directed activity (either " "socially, at work or school, or sexually) or psychomotor agitation (ie, purposeless non-goal-directed activity). denies  7) Excessive involvement in activities that have a high potential for painful consequences  (unrestrained buying sprees, sexual indiscretions, or foolish business investments). Drinking, buying sprees "not bad, but more than I should have been spending", recently impulsively dyed hair pink      BORDERLINE PERSONALITY DISORDER SCREEN  A pervasive pattern of instability of interpersonal relationships, self-image, and affects, and marked impulsivity, beginning by early adulthood and present in a variety of contexts, as indicated by five (or more) of the followin. Frantic efforts to avoid real or imagined abandonment - endorses, recently  from partner of 6 years and the father of her child  2. A pattern of unstable and intense interpersonal relationships characterized by alternating between extremes of idealization and devaluation. Endorses "I feel like I play victim a lot" "I can be selfish when I'm triggered."   3. Identity disturbance: markedly and persistently unstable self-image or sense of self. Endorses "I feel like my personality changes depending on my situation"  4. Impulsivity in at least two areas that are potentially self-damaging (eg, spending, sex, substance abuse, reckless driving, binge eating). (Note: Do not include suicidal or self-mutilating behavior covered in Criterion 5.) "hooking up with people" (cheated on partner of 6 years), drinking, spending money, dyed hair pink, not attending school recently  5. Recurrent suicidal behavior, gestures, or threats, or self-mutilating behavior - "fleeting" suicidal thoughts  6. Affective instability due to a marked reactivity of mood (eg, intense episodic dysphoria, irritability, or anxiety usually lasting a few hours and only rarely more than a few days). Endorses - reports rapid mood swings, crying spells, "focusing on " "things for hours - that's all that matters at the time"  7. Chronic feelings of emptiness - endorses  8. Inappropriate, intense anger or difficulty controlling anger (eg, frequent displays of temper, constant anger, recurrent physical fights). endorses  9. Transient, stress-related paranoid ideation or severe dissociative symptoms - denies      Depression:    Mood: +depressed mood  Interest/pleasure/anhedonia: +  Guilt/worthlssness:  +  SI: no  Sleep: "ok", 6-7 hours  Energy: fatigued, "even when I get sleep it doesn't do anything for me"   Appetite/weight: normal appetite, but recent wt loss of 5 lbs  Concentration/indecisiveness: decreased  Decreased motivation - hasnt gone to school   Psychomotor activity: no  S.I.B.s/risky behavior: no    3/2/22: Today patient reports her mood is "good."  She denies depressed mood, crying spells, anxiety, or irritability.  And states I am doing really good overall.  She notes improvement in quantity and quality of sleep after starting trazodone.  She notes that although her mood has improved she still has my depressive habits reports that she has to put effort into getting up in the morning and taking care of herself though she reiterates that she is no longer having feelings of sadness.  She states she has  a lot going on with school" but needs to grow out of my depressive habits.  Encouraged individual psychotherapy which patient verbalizes interest in.    Initial HPI: Pt. is a 26 y.o. female, with a past psychiatric hx of anxiety and mood disorder presenting to the clinic for an initial evaluation and treatment. Past medical history detailed below. She reports she has struggled with depression and anxiety for "years." She was seen by child psychiatrist Dariusz Jimenez MD from [id number] during which time she was initially diagnosed with bipolar disorder, but she reports this diagnosis was later changed to mood disorder. She denies ever having symptoms of shan " "including grandiosity, decreased need for sleep, excessive talking, distractibility, increase in goal-directed activity, or recklessness. She reports that her mood is depressed at baseline and she occasionally has periods where it is lower than normal. She reports a period of approximately 2 months after the birth of her son in October of 2020 during which she experienced markedly depressed mood and anxiety. She did not seek treatment at that time; she has not sought treatment since terminating with Dr. Jimenez in 2014. She is not currently taking any medications. At this time she reports symptoms of depressed mood, anhedonia, fatigue, feelings of guilt and worthlessness, fatigue, and concentration problems. She also reports excessive anxiety and worry which she has difficulty controlling, restlessness, irritability, and headaches, as well as anxiety around groups of people, including classes at school and the grocery store, and dwelling on conversations she has had. She also reports frequent negative thinking and worst case scenario thinking. These symptoms have been consistent for "years." She reports she is under a great deal of stress related to her relationship with her son's father. She further states she would like pharmacological treatment for her depressed mood and anxiety. She states she does not wish to take SSRI's as she did not like the way they made her feel. She states she has done well on Lamictal in the past and would like to try Lamictal again.     Initial Psych ROS:  Depression: no appetite/weight changes, insomnia/hypersomnia, difficulty concentrating, or recurrent thoughts of death or SI  Sleep: Sleeps 8  hours a night on average. Wakes up fatigued.  Sallie: No episodes of expansive mood, decreased need for sleep, increased goal directed behaviors, or racing thoughts  Anxiety: no panic attacks, agoraphobia, phobias, avoidance, or somatic related complaints   OCD: no obsessions or compulsive " "behaviors   PTSD: no flashbacks, nightmares, or avoidance of stimuli  Psychosis: no current A/V hallucinations or delusions - She does report feeling as though she "was being haunted by things in closet, I would talk to it," but only after beginning an unknown multidrug regimen in 2013  SI/HI - passive SI, reports fleeting thoughts such as "What it I  wasn't here?" However she states, "I would never do that." Denies a plan or suicidal intent. Reports she has her son to live for. Agrees to call the clinic or 911 if she develops SI with intent or plan  Access to guns: denies    Past Psychiatric History:  Past Psych Hx: anxiety and mood disorder  Dr Jimenez in 2013  First psych contact: inpatient psych admission at [facility] in 2013 after attempted suicide by unknown means, possible overdose     Prior hospitalizations: 2-3 times at [facility] for suicide attempts; she reports she has difficulty remembering this part of her life and cannot remember the method of theses attempts "maybe pills"  Prior suicide attempts or self-harm: 2-3, see above; denies cutting  Prior diagnosis: bipolar disorder, social phobia  Prior meds: Prozac, Lamictal, other unknown medications  Current meds: none  Prior psychotherapy: 1 x month with  for last year      Past Medical Hx:   Past Medical History:   Diagnosis Date    History of psychiatric care     History of psychiatric hospitalization     Self-harming behavior     Suicide attempt    Hx of TBI: no      Hx of seizures: no     Family Hx:   Paternal: father hx of ADHD; several family members with alcohol use disorder   Maternal: mother - undiagnosed but anxious, irritable  Siblings: 1 sister with similar symptoms of anxiety    Social Hx:   Childhood: Born in [place], raised in [place]  Marital Status: never   Children: 1 year old sonDevante  Resides: [place]  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[email or web address]; student  Hobbies: none currently, she reports she is " trying to read again, however between her 1 year old and school, she does not have much time for herself  Hindu:  denies  Education level: some college, currently attending [employer] majoring in social work, expected graduation date 2023  : denies   Legal: denies     Substance Hx:  Caffeine: Coffee 3-4 cups per coffee/day if she is at work or school  Tobacco:  former smoker, quit in Jan 2020  Alcohol: occasional, approximately once every few months, 2-3 drinks per occasion  Drug use: no  Rehab: no  Prior/current AA: no    Interim hx:     Medication changes last visit: 9/26/2022  Start typical titration of Lamictal for mood, 25 mg p.o. daily times 14 days, then 50 mg p.o. daily times 14 days, then 100 mg p.o. daily times 14 days, then 200 mg p.o. daily  Start Wellbutrin  mg po q.a.m. for anxiety and mood  Referral placed for dialectical behavior therapy    3/2/22  ContinueLamictal 200 mg po daily for mood  Continue Wellbutrin  mg po q.a.m.  to target anxiety and mood  Continue hydroxyzine 25 mg po TID PRN anxiety  Continue trazodone  mg po q.h.s. PRN insomnia    1/31/22:  Continue Lamictal 200 mg po daily for mood  Increase Wellbutrin  mg q.a.m. to 300 mg po q.a.m.  to target anxiety and mood  Continue hydroxyzine 25 mg po TID PRN anxiety  Start trazodone  mg po q.h.s. PRN insomnia    Medication changes on 12/2  Increase Lamictal to 200 mg po daily to target irritability and concentration deficit  Start Wellbutrin  mg q.a.m. to target anxiety and irritability  Start hydroxyzine 25 mg po TID PRN anxiety        Alcohol/Drugs/Caffeine/Tobacco: No change in consumption    Review of Systems   PSYCHIATRIC: Pertinant items are noted in the narrative.  All other systems reviewed and found to be negative    Past Medical, Family and Social History: The patient's past medical, family and social history have been reviewed and updated as appropriate within the  "electronic medical record - see encounter notes.    Adherence: yes    Side effects: None    Risk Parameters:  Patient reports no suicidal ideation  Patient reports no homicidal ideation  Patient reports no self-injurious behavior  Patient reports no violent behavior    Exam   Constitutional  Vitals:  Most recent vital signs, dated greater than 90 days prior to this appointment, were reviewed.   Last 3 sets of Vitals    Vitals - 1 value per visit 8/8/2022 9/7/2022 9/7/2022   SYSTOLIC 120 - 118   DIASTOLIC 80 - 86   Pulse - - -   Temp - - -   Resp - - -   SPO2 - - -   Weight (lb) 174.38 - 175.93   Weight (kg) 79.1 - 79.8   Height - - -   BMI (Calculated) - - -   VISIT REPORT - - -   Pain Score  - 0 -          General:  unremarkable, age appropriate, normal weight, well nourished, casually dressed     Musculoskeletal  Muscle Strength/Tone:  Unable to assess due to nature of virtual visit   Gait & Station:  Unable to assess due to nature of virtual visit     Psychiatric  Speech:  no latency; no press   Mood & Affect:  "Better"  congruent and appropriate   Thought Process:  normal and logical   Associations:  intact   Thought Content:  normal, no suicidality, no homicidality, delusions, or paranoia   Insight:  intact   Judgement: behavior is adequate to circumstances   Orientation:  grossly intact   Memory: intact for content of interview   Language: grossly intact   Attention Span & Concentration:  able to focus   Fund of Knowledge:  intact and appropriate to age and level of education     Suicide Risk Assessment:  Protective factors: age, gender, no ongoing substance abuse, no psychosis, has a son, denies active SI/intent/plan, seeking treatment, access to treatment, future oriented, good primary support, no access to firearms  Risks: 2-3 prior attempts, 2-3 prior hospitalizations, ongoing depression and anxiety  Patient is a low immediate and long-term risk considering risk factors. Patient denied suicidal or " homicidal ideation, plan, or intent.  Patient noted agreement to call 911 and/or present to the nearest emergency department if Pt develops suicidal or homicidal ideation, plan, or intent.      Assessment and Diagnosis   Status/Progress: Based on the examination today, the patient's problem(s) is/are improved.  New problems have not been presented today.   Co-morbidities are not complicating management of the primary condition.  The working differential for this patient includes bipolar disorder, mood disorder NOS, social anxiety, intermittent explosive disorder.     General Impression: Pt is a 27 y.o. female with past history of anxiety and mood disorder presenting today for a follow up visit.  Patient reports improvement in mood and motivation after restarting lamotrigine and Wellbutrin XL at last visit.  She has maintained sobriety over the interim.  She does requests an increase in Wellbutrin dose.  Through shared decision-making Wellbutrin XL will be increased to 300 mg p.o. q.a.m. for mood and anxiety.  Patient will be starting lamotrigine 100 mg daily dose.  We will follow up in 4 weeks.    9/26/2022: Patient reports she discontinued all psychotropic medications after our last visit and has been drinking in excess.  Today, patient endorses multiple symptoms of borderline personality disorder.  As Lamictal and Wellbutrin provided good relief of symptoms previously we will restart these medications today.  Discussed dialectical behavior therapy as the gold standard treatment for borderline personality disorder with patient who verbalized understanding and agrees to treatment plan.    Safe for outpatient follow up and no acute safety concerns.    Encounter Diagnoses   Name Primary?    Moderate episode of recurrent major depressive disorder Yes    Borderline personality disorder     TAZ (generalized anxiety disorder)        Intervention/Counseling/Treatment Plan   Medication Management: The risks and benefits of  medication were discussed with the patient. Shared decision making occurred   The treatment plan and follow up plan were reviewed with the patient.   Continue typical titration of Lamictal for mood, 100 mg p.o. daily times 14 days, then 200 mg p.o. daily  Increase Wellbutrin XL to 300 mg po q.a.m. for anxiety and mood  Referral placed for dialectical behavior therapy  Recommend keeping appointment for individual psychotherapy through school; offered referral for individual psychotherapy with in house therapists, patient declined  Call to report any worsening of symptoms or problems with the medication. Pt instructed to go to ER with thoughts of harming self, others  Labs: no new orders      Psychotherapy:   Target symptoms: depression, anxiety   Outcome monitoring methods: self-report, observation  Therapeutic Intervention Type: supportive psychotherapy  Why chosen therapy is appropriate versus another modality: relevant to diagnosis, patient responds to this modality  Patient's response to intervention:The patient's response to intervention is accepting.  Progress toward goals: The patient's progress toward goals is good.  Topics discussed/themes: building skills sets for symptom management, symptom recognition, nutrition, exercise  Duration of intervention: 10 minutes    Return to Clinic: 1 month    -Pt given contact number for psychotherapists at [facility] ans also instructed they may check with insurance for a list of providers  -Spent 30min face to face with the pt; >50% time spent in counseling   -Supportive therapy and psychoeducation provided  -R/B/SE's of medications discussed with the pt who expresses understanding and chooses to take medications as prescribed.   -Pt instructed to call clinic, 911 or go to nearest emergency room if sxs worsen or pt is in   crisis. The pt expresses understanding.    EVI Turner, PMHNP-BC  Department of Psychiatry - [facility]  [id number] [address]  Office: [phone]  Fax: [phone]

## 2022-10-26 ENCOUNTER — PATIENT MESSAGE (OUTPATIENT)
Dept: PSYCHIATRY | Facility: CLINIC | Age: 27
End: 2022-10-26
Payer: COMMERCIAL

## 2022-11-15 ENCOUNTER — TELEPHONE (OUTPATIENT)
Dept: PSYCHIATRY | Facility: CLINIC | Age: 27
End: 2022-11-15
Payer: COMMERCIAL

## 2022-11-15 NOTE — TELEPHONE ENCOUNTER
Called to set up virtual intake appointment for DBT group Spring 2023. No answer, Left voice message, sent my chart message.

## 2022-11-18 ENCOUNTER — PATIENT MESSAGE (OUTPATIENT)
Dept: PSYCHIATRY | Facility: CLINIC | Age: 27
End: 2022-11-18
Payer: COMMERCIAL

## 2022-11-21 ENCOUNTER — PATIENT MESSAGE (OUTPATIENT)
Dept: PSYCHIATRY | Facility: CLINIC | Age: 27
End: 2022-11-21
Payer: COMMERCIAL

## 2022-11-21 ENCOUNTER — OFFICE VISIT (OUTPATIENT)
Dept: PSYCHIATRY | Facility: CLINIC | Age: 27
End: 2022-11-21
Payer: COMMERCIAL

## 2022-11-21 DIAGNOSIS — F51.04 PSYCHOPHYSIOLOGICAL INSOMNIA: ICD-10-CM

## 2022-11-21 DIAGNOSIS — F41.1 GAD (GENERALIZED ANXIETY DISORDER): ICD-10-CM

## 2022-11-21 DIAGNOSIS — F33.1 MODERATE EPISODE OF RECURRENT MAJOR DEPRESSIVE DISORDER: Primary | ICD-10-CM

## 2022-11-21 DIAGNOSIS — F60.3 BORDERLINE PERSONALITY DISORDER: ICD-10-CM

## 2022-11-21 PROCEDURE — 99999 PR PBB SHADOW E&M-EST. PATIENT-LVL II: ICD-10-PCS | Mod: PBBFAC,,,

## 2022-11-21 PROCEDURE — 99214 OFFICE O/P EST MOD 30 MIN: CPT | Mod: 95,,,

## 2022-11-21 PROCEDURE — 99999 PR PBB SHADOW E&M-EST. PATIENT-LVL II: CPT | Mod: PBBFAC,,,

## 2022-11-21 PROCEDURE — 1160F PR REVIEW ALL MEDS BY PRESCRIBER/CLIN PHARMACIST DOCUMENTED: ICD-10-PCS | Mod: 95,CPTII,,

## 2022-11-21 PROCEDURE — 99214 PR OFFICE/OUTPT VISIT, EST, LEVL IV, 30-39 MIN: ICD-10-PCS | Mod: 95,,,

## 2022-11-21 PROCEDURE — 1160F RVW MEDS BY RX/DR IN RCRD: CPT | Mod: 95,CPTII,,

## 2022-11-21 PROCEDURE — 1159F MED LIST DOCD IN RCRD: CPT | Mod: 95,CPTII,,

## 2022-11-21 PROCEDURE — 1159F PR MEDICATION LIST DOCUMENTED IN MEDICAL RECORD: ICD-10-PCS | Mod: 95,CPTII,,

## 2022-11-21 RX ORDER — LAMOTRIGINE 200 MG/1
200 TABLET ORAL DAILY
Qty: 30 TABLET | Refills: 2 | Status: SHIPPED | OUTPATIENT
Start: 2022-11-21 | End: 2022-12-29 | Stop reason: SDUPTHER

## 2022-11-21 NOTE — PROGRESS NOTES
" Outpatient Psychiatry Follow-Up Visit (MD/NP)    11/21/2022    Clinical Status of Patient:  Outpatient (Virtual)  The patient location is: [address] [place] [address]  The patient phone number is: [phone]   Visit type: Virtual visit with synchronous audio and video  Each patient to whom he or she provides medical services by telemedicine is:  (1) informed of the relationship between the practitioner and patient and the respective role of any other health care provider with respect to management of the patient; and (2) notified that he or she may decline to receive medical services by telemedicine and may withdraw from such care at any time.    Chief Complaint:  Mirella Kline is a 27 y.o. female who presents today for follow-up of depression and anxiety.  Met with patient.      Interval History and Content of Current Session:  Interim Events/Subjective Report/Content of Current Session:   Pt is a 27 y.o. female with history of MDD, TAZ, borderline personality disorder who presents for follow up treatment. Pt established care with me on 11/4/21, where Pt met criteria for MDD and TAZ. Pt is currently taking lamotrigine 200 mg p.o. daily and Wellbutrin  mg po q.a.m. Pt has had previous trials of Prozac (ineffective), Lamictal (effective), Wellbutrin XL (effective), hydroxyzine, melatonin, and trazodone.    Patient reports symptoms of depressed mood and anxiety continue to be well controlled with current medication regimen.   She denies abrupt changes in mood over the interim.  She states "even when I feel over emotional I am able to control my reactions."  She does report some increased stress surrounding final exams which are in 2 weeks,  but denies experiencing anxiety.  She notes an increase in insomnia but states she has been going to bed late and scrolling on her phone.  Discussed good sleep hygiene,  consistent bedtimes, no screens before bedtime.  Patient reports she has an interview " "for a DBT group scheduled for December.  This DBT group is scheduled to begin in April.      Psych ROS:    Mood: "better overall"  Interest/pleasure/anhedonia: improving  Guilt/worthlssness: denies  SI: no  Sleep: +insomnia, worsening over the last week   Energy: "pretty good"  Appetite/weight: normal appetite, but recent wt loss of 5 lbs - was 174 last night 159 but has been trying ot lose weight  Concentration/indecisiveness: better  Motivation - improving   Psychomotor activity: no  S.I.B.s/risky behavior: no  Anxiety: decreased      10/24/2022: Patient reports no difficulty restarting medication over the interim and denies side effects.  She reports improvement in mood and motivation and has been able to attend school.  She does endorse a situational component to her depression and states, "I'm not sad because I don't have a reason.  I'm sad because of my family situation.  She has maintained sobriety over the interim.  She denies suicidal ideation.     9/26/2022: Pt's last visit was 3/2/22. In the interim she discontinued all medications after an increase in etoh intake beginning approx 9 months ago that peaked 2 months ago. Pt notes she has decreased etoh intake since that time and now only occasionally has a drink. Pt reports she would like a re-evaluation of previous diagnoses and notes she has research bipolar disorder and borderline personality disorder.  She feels like her symptoms represent borderline personality disorder.  She does note she was starting individual psychotherapy through her school on October 4th.    HYPOMANIA SCREEN      A. A distinct period of abnormally and persistently elevated, expansive, or irritable mood and abnormally and persistently increased activity or energy, lasting at least four consecutive days and present most of the day, nearly every day. Denies elevated mood lasting more than 1 day    B. During the period of mood disturbance and increased energy and activity, three (or " "more) of the following symptoms (four if the mood is only irritable) have persisted, represent a noticeable change from usual behavior, and have been present to a significant degree:    1) Inflated self-esteem or grandiosity. "Attention at the bar makes me feel good"  2) Decreased need for sleep (eg, feels rested after only three hours of sleep): denies  3) More talkative than usual or pressure to keep talking. "It depends"  4) Flight of ideas or subjective experience that thoughts are racing. More than usual lately  5) Distractibility "Always"  6) Increase in goal-directed activity (either socially, at work or school, or sexually) or psychomotor agitation (ie, purposeless non-goal-directed activity). denies  7) Excessive involvement in activities that have a high potential for painful consequences  (unrestrained buying sprees, sexual indiscretions, or foolish business investments). Drinking, buying sprees "not bad, but more than I should have been spending", recently impulsively dyed hair pink      BORDERLINE PERSONALITY DISORDER SCREEN  A pervasive pattern of instability of interpersonal relationships, self-image, and affects, and marked impulsivity, beginning by early adulthood and present in a variety of contexts, as indicated by five (or more) of the followin. Frantic efforts to avoid real or imagined abandonment - endorses, recently  from partner of 6 years and the father of her child  2. A pattern of unstable and intense interpersonal relationships characterized by alternating between extremes of idealization and devaluation. Endorses "I feel like I play victim a lot" "I can be selfish when I'm triggered."   3. Identity disturbance: markedly and persistently unstable self-image or sense of self. Endorses "I feel like my personality changes depending on my situation"  4. Impulsivity in at least two areas that are potentially self-damaging (eg, spending, sex, substance abuse, reckless driving, " "binge eating). (Note: Do not include suicidal or self-mutilating behavior covered in Criterion 5.) "hooking up with people" (cheated on partner of 6 years), drinking, spending money, dyed hair pink, not attending school recently  5. Recurrent suicidal behavior, gestures, or threats, or self-mutilating behavior - "fleeting" suicidal thoughts  6. Affective instability due to a marked reactivity of mood (eg, intense episodic dysphoria, irritability, or anxiety usually lasting a few hours and only rarely more than a few days). Endorses - reports rapid mood swings, crying spells, "focusing on things for hours - that's all that matters at the time"  7. Chronic feelings of emptiness - endorses  8. Inappropriate, intense anger or difficulty controlling anger (eg, frequent displays of temper, constant anger, recurrent physical fights). endorses  9. Transient, stress-related paranoid ideation or severe dissociative symptoms - denies      Depression:    Mood: +depressed mood  Interest/pleasure/anhedonia: +  Guilt/worthlssness:  +  SI: no  Sleep: "ok", 6-7 hours  Energy: fatigued, "even when I get sleep it doesn't do anything for me"   Appetite/weight: normal appetite, but recent wt loss of 5 lbs  Concentration/indecisiveness: decreased  Decreased motivation - hasnt gone to school   Psychomotor activity: no  S.I.B.s/risky behavior: no    Initial HPI: Pt. is a 26 y.o. female, with a past psychiatric hx of anxiety and mood disorder presenting to the clinic for an initial evaluation and treatment. Past medical history detailed below. She reports she has struggled with depression and anxiety for "years." She was seen by child psychiatrist Dariusz Jimenez MD from [id number] during which time she was initially diagnosed with bipolar disorder, but she reports this diagnosis was later changed to mood disorder. She denies ever having symptoms of shan including grandiosity, decreased need for sleep, excessive talking, distractibility, " "increase in goal-directed activity, or recklessness. She reports that her mood is depressed at baseline and she occasionally has periods where it is lower than normal. She reports a period of approximately 2 months after the birth of her son in October of 2020 during which she experienced markedly depressed mood and anxiety. She did not seek treatment at that time; she has not sought treatment since terminating with Dr. Jimenez in 2014. She is not currently taking any medications. At this time she reports symptoms of depressed mood, anhedonia, fatigue, feelings of guilt and worthlessness, fatigue, and concentration problems. She also reports excessive anxiety and worry which she has difficulty controlling, restlessness, irritability, and headaches, as well as anxiety around groups of people, including classes at school and the grocery store, and dwelling on conversations she has had. She also reports frequent negative thinking and worst case scenario thinking. These symptoms have been consistent for "years." She reports she is under a great deal of stress related to her relationship with her son's father. She further states she would like pharmacological treatment for her depressed mood and anxiety. She states she does not wish to take SSRI's as she did not like the way they made her feel. She states she has done well on Lamictal in the past and would like to try Lamictal again.     Initial Psych ROS:  Depression: no appetite/weight changes, insomnia/hypersomnia, difficulty concentrating, or recurrent thoughts of death or SI  Sleep: Sleeps 8  hours a night on average. Wakes up fatigued.  Sallie: No episodes of expansive mood, decreased need for sleep, increased goal directed behaviors, or racing thoughts  Anxiety: no panic attacks, agoraphobia, phobias, avoidance, or somatic related complaints   OCD: no obsessions or compulsive behaviors   PTSD: no flashbacks, nightmares, or avoidance of stimuli  Psychosis: no " "current A/V hallucinations or delusions - She does report feeling as though she "was being haunted by things in closet, I would talk to it," but only after beginning an unknown multidrug regimen in 2013  SI/HI - passive SI, reports fleeting thoughts such as "What it I  wasn't here?" However she states, "I would never do that." Denies a plan or suicidal intent. Reports she has her son to live for. Agrees to call the clinic or 911 if she develops SI with intent or plan    Past Psychiatric History:  Past Psych Hx: anxiety and mood disorder  Dr Jimenez in 2013  First psych contact: inpatient psych admission at [facility] in 2013 after attempted suicide by unknown means, possible overdose     Prior hospitalizations: 2-3 times at [facility] for suicide attempts; she reports she has difficulty remembering this part of her life and cannot remember the method of theses attempts "maybe pills"  Prior suicide attempts or self-harm: 2-3, see above; denies cutting  Prior diagnosis: bipolar disorder, social phobia  Prior meds: Prozac, Lamictal, other unknown medications  Current meds: none  Prior psychotherapy: 1 x month with  for last year      Past Medical Hx:   Past Medical History:   Diagnosis Date    History of psychiatric care     History of psychiatric hospitalization     Self-harming behavior     Suicide attempt    Hx of TBI: no      Hx of seizures: no     Family Hx:   Paternal: father hx of ADHD; several family members with alcohol use disorder   Maternal: mother - undiagnosed but anxious, irritable  Siblings: 1 sister with similar symptoms of anxiety    Social Hx:   Childhood: Born in [place], raised in [place]  Marital Status: never   Children: 1 year old sonDevante  Resides: [place]  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 majoring in social work, expected graduation date 2023  : denies   Legal: denies  Access to guns: denies     Substance Hx:  Caffeine: Coffee 3-4 cups per coffee/day if she is at work or school  Tobacco:  former smoker, quit in Jan 2020  Alcohol: occasional, approximately once every few months, 2-3 drinks per occasion  Drug use: no  Rehab: no  Prior/current AA: no    Interim hx:     Medication changes last visit: 10/24/2022  Continue typical titration of Lamictal for mood, 100 mg p.o. daily times 14 days, then 200 mg p.o. daily  Increase Wellbutrin XL to 300 mg po q.a.m. for anxiety and mood    9/26/2022  Start typical titration of Lamictal for mood, 25 mg p.o. daily times 14 days, then 50 mg p.o. daily times 14 days, then 100 mg p.o. daily times 14 days, then 200 mg p.o. daily  Start Wellbutrin  mg po q.a.m. for anxiety and mood  Referral placed for dialectical behavior therapy    Alcohol/Drugs/Caffeine/Tobacco: No change in consumption    Review of Systems   PSYCHIATRIC: Pertinant items are noted in the narrative.  All other systems reviewed and found to be negative    Past Medical, Family and Social History: The patient's past medical, family and social history have been reviewed and updated as appropriate within the electronic medical record - see encounter notes.    Adherence: yes    Side effects: None    Risk Parameters:  Patient reports no suicidal ideation  Patient reports no homicidal ideation  Patient reports no self-injurious behavior  Patient reports no violent behavior    Exam   Constitutional  Vitals:  Most recent vital signs, dated greater than 90 days prior to this appointment, were reviewed.   Last 3 sets of Vitals    Vitals - 1 value per visit 8/8/2022 9/7/2022 9/7/2022   SYSTOLIC 120 - 118   DIASTOLIC 80 - 86   Pulse - - -   Temp - - -   Resp - - -   SPO2 - - -   Weight (lb)  "174.38 - 175.93   Weight (kg) 79.1 - 79.8   Height - - -   BMI (Calculated) - - -   VISIT REPORT - - -   Pain Score  - 0 -          General:  unremarkable, age appropriate, normal weight, well nourished, casually dressed     Musculoskeletal  Muscle Strength/Tone:  Unable to assess due to nature of virtual visit   Gait & Station:  Unable to assess due to nature of virtual visit     Psychiatric  Speech:  no latency; no press   Mood & Affect:  "Better"  congruent and appropriate   Thought Process:  normal and logical   Associations:  intact   Thought Content:  normal, no suicidality, no homicidality, delusions, or paranoia   Insight:  intact   Judgement: behavior is adequate to circumstances   Orientation:  grossly intact   Memory: intact for content of interview   Language: grossly intact   Attention Span & Concentration:  able to focus   Fund of Knowledge:  intact and appropriate to age and level of education     Suicide Risk Assessment:  Protective factors: age, gender, no ongoing substance abuse, no psychosis, has a son, denies active SI/intent/plan, seeking treatment, access to treatment, future oriented, good primary support, no access to firearms  Risks: 2-3 prior attempts, 2-3 prior hospitalizations, ongoing depression and anxiety  Patient is a low immediate and long-term risk considering risk factors. Patient denied suicidal or homicidal ideation, plan, or intent.  Patient noted agreement to call 911 and/or present to the nearest emergency department if Pt develops suicidal or homicidal ideation, plan, or intent.      Assessment and Diagnosis   Status/Progress: Based on the examination today, the patient's problem(s) is/are improved.  New problems have not been presented today.   Co-morbidities are not complicating management of the primary condition.  The working differential for this patient includes bipolar disorder, mood disorder NOS, social anxiety, intermittent explosive disorder.     General Impression: " Pt is a 27 y.o. female with past history of anxiety and mood disorder presenting today for a follow up visit.    Symptoms of depressed mood and anxiety are well controlled on current medication regimen.  Through shared decision making, pharmacological intervention will be continued.    10/24/2022: Patient reports improvement in mood and motivation after restarting lamotrigine and Wellbutrin XL at last visit.  She has maintained sobriety over the interim.  She does requests an increase in Wellbutrin dose.  Through shared decision-making Wellbutrin XL will be increased to 300 mg p.o. q.a.m. for mood and anxiety.  Patient will be starting lamotrigine 100 mg daily dose.  We will follow up in 4 weeks.    9/26/2022: Patient reports she discontinued all psychotropic medications after our last visit and has been drinking in excess.  Today, patient endorses multiple symptoms of borderline personality disorder.  As Lamictal and Wellbutrin provided good relief of symptoms previously we will restart these medications today.  Discussed dialectical behavior therapy as the gold standard treatment for borderline personality disorder with patient who verbalized understanding and agrees to treatment plan.    Safe for outpatient follow up and no acute safety concerns.    Encounter Diagnoses   Name Primary?    Moderate episode of recurrent major depressive disorder Yes    Borderline personality disorder     TAZ (generalized anxiety disorder)     Psychophysiological insomnia        Intervention/Counseling/Treatment Plan   Medication Management: The risks and benefits of medication were discussed with the patient. Shared decision making occurred   The treatment plan and follow up plan were reviewed with the patient.   Continue Lamictal 200 mg p.o. daily for mood  Increase Wellbutrin XL to 300 mg po q.a.m. for anxiety and mood  Keep appt with LCSW for interview for dialectical behavior therapy  Call to report any worsening of symptoms or  problems with the medication. Pt instructed to go to ER with thoughts of harming self, others  Labs: no new orders      Psychotherapy:   Target symptoms: depression, anxiety   Outcome monitoring methods: self-report, observation  Therapeutic Intervention Type: supportive psychotherapy  Why chosen therapy is appropriate versus another modality: relevant to diagnosis, patient responds to this modality  Patient's response to intervention:The patient's response to intervention is accepting.  Progress toward goals: The patient's progress toward goals is good.  Topics discussed/themes: building skills sets for symptom management, symptom recognition, nutrition, exercise  Duration of intervention: 10 minutes    Return to Clinic: 6 weeks in person    -Pt given contact number for psychotherapists at [facility] ans also instructed they may check with insurance for a list of providers  -Spent 30min face to face with the pt; >50% time spent in counseling   -Supportive therapy and psychoeducation provided  -R/B/SE's of medications discussed with the pt who expresses understanding and chooses to take medications as prescribed.   -Pt instructed to call clinic, 911 or go to nearest emergency room if sxs worsen or pt is in   crisis. The pt expresses understanding.    EVI Turner, PMHNP-BC  Department of Psychiatry - [facility]  [address] [place] [address]  Office: [phone]  Fax: [phone]

## 2022-12-15 ENCOUNTER — OFFICE VISIT (OUTPATIENT)
Dept: PSYCHIATRY | Facility: CLINIC | Age: 27
End: 2022-12-15
Payer: COMMERCIAL

## 2022-12-15 DIAGNOSIS — F41.1 GAD (GENERALIZED ANXIETY DISORDER): ICD-10-CM

## 2022-12-15 DIAGNOSIS — F60.3 BORDERLINE PERSONALITY DISORDER: Primary | ICD-10-CM

## 2022-12-15 PROCEDURE — 90791 PR PSYCHIATRIC DIAGNOSTIC EVALUATION: ICD-10-PCS | Mod: 95,,, | Performed by: SOCIAL WORKER

## 2022-12-15 PROCEDURE — 90791 PSYCH DIAGNOSTIC EVALUATION: CPT | Mod: 95,,, | Performed by: SOCIAL WORKER
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
## 2023-01-03 ENCOUNTER — PATIENT MESSAGE (OUTPATIENT)
Dept: PSYCHIATRY | Facility: CLINIC | Age: 28
End: 2023-01-03
Payer: COMMERCIAL

## 2023-01-17 ENCOUNTER — OFFICE VISIT (OUTPATIENT)
Dept: PSYCHIATRY | Facility: CLINIC | Age: 28
End: 2023-01-17
Payer: COMMERCIAL

## 2023-01-17 DIAGNOSIS — G47.00 INSOMNIA, UNSPECIFIED TYPE: ICD-10-CM

## 2023-01-17 DIAGNOSIS — F33.41 RECURRENT MAJOR DEPRESSIVE DISORDER, IN PARTIAL REMISSION: Primary | ICD-10-CM

## 2023-01-17 DIAGNOSIS — F60.3 BORDERLINE PERSONALITY DISORDER: ICD-10-CM

## 2023-01-17 DIAGNOSIS — F41.1 GAD (GENERALIZED ANXIETY DISORDER): ICD-10-CM

## 2023-01-17 PROCEDURE — 1159F MED LIST DOCD IN RCRD: CPT | Mod: CPTII,95,,

## 2023-01-17 PROCEDURE — 1159F PR MEDICATION LIST DOCUMENTED IN MEDICAL RECORD: ICD-10-PCS | Mod: CPTII,95,,

## 2023-01-17 PROCEDURE — 99214 PR OFFICE/OUTPT VISIT, EST, LEVL IV, 30-39 MIN: ICD-10-PCS | Mod: 95,,,

## 2023-01-17 PROCEDURE — 99214 OFFICE O/P EST MOD 30 MIN: CPT | Mod: 95,,,

## 2023-01-17 PROCEDURE — 1160F RVW MEDS BY RX/DR IN RCRD: CPT | Mod: CPTII,95,,

## 2023-01-17 PROCEDURE — 1160F PR REVIEW ALL MEDS BY PRESCRIBER/CLIN PHARMACIST DOCUMENTED: ICD-10-PCS | Mod: CPTII,95,,

## 2023-01-17 RX ORDER — BUPROPION HYDROCHLORIDE 300 MG/1
300 TABLET ORAL DAILY
Qty: 90 TABLET | Refills: 0 | Status: SHIPPED | OUTPATIENT
Start: 2023-01-17 | End: 2023-04-18

## 2023-01-17 RX ORDER — DOXEPIN HYDROCHLORIDE 10 MG/1
10 CAPSULE ORAL NIGHTLY PRN
Qty: 30 CAPSULE | Refills: 2 | Status: SHIPPED | OUTPATIENT
Start: 2023-01-17 | End: 2023-04-20

## 2023-01-17 NOTE — PROGRESS NOTES
" Outpatient Psychiatry Follow-Up Visit (MD/NP)    1/17/2023    Clinical Status of Patient:  Outpatient (Virtual)  The patient location is: [address] [place] [address]  The patient phone number is: [phone]   Visit type: Virtual visit with synchronous audio and video  Each patient to whom he or she provides medical services by telemedicine is:  (1) informed of the relationship between the practitioner and patient and the respective role of any other health care provider with respect to management of the patient; and (2) notified that he or she may decline to receive medical services by telemedicine and may withdraw from such care at any time.    Chief Complaint:  Mirella Kline is a 27 y.o. female who presents today for follow-up of depression and anxiety.  Met with patient.      Interval History and Content of Current Session:  Interim Events/Subjective Report/Content of Current Session:   Pt is a 27 y.o. female with history of MDD, TAZ, borderline personality disorder, and insomnia who presents for follow up treatment. Pt established care with me on 11/4/21. Pt is currently taking lamotrigine 200 mg p.o. daily and Wellbutrin  mg po q.a.m. Pt has had previous trials of Prozac (ineffective), Lamictal (effective), Wellbutrin XL (effective), hydroxyzine, melatonin, and trazodone (AM drowsiness).    Today, patient states, My mood is good. Everything is good except for sleep." She notes continued difficulty initiating and maintaining sleep and states she can not sleep longer than 2 hours at a time.  She reports difficulty returning to sleep after awakening.  She experiences difficulty sleeping even when practicing good sleep hygiene and not taking naps during the day.  Trazodone caused morning grogginess.    Patient reports she is unable to attend [facility] DBT group due to conflicts with her school schedule.  She has found a DBT therapy program at [facility] which is an 8 week " "program.  She will be speaking to  about enrollment later today.    Patient reports her son recently broke his leg in an accident on a playground; this is causing some increase in stress.  Patient also reports she will be graduating college next semester.      11/21/2022: Patient reports symptoms of depressed mood and anxiety continue to be well controlled with current medication regimen.   She denies abrupt changes in mood over the interim.  She states "even when I feel over emotional I am able to control my reactions."  She does report some increased stress surrounding final exams which are in 2 weeks,  but denies experiencing anxiety.  She notes an increase in insomnia but states she has been going to bed late and scrolling on her phone.  Discussed good sleep hygiene,  consistent bedtimes, no screens before bedtime.  Patient reports she has an interview for a DBT group scheduled for December.  This DBT group is scheduled to begin in April.    10/24/2022: Patient reports no difficulty restarting medication over the interim and denies side effects.  She reports improvement in mood and motivation and has been able to attend school.  She does endorse a situational component to her depression and states, "I'm not sad because I don't have a reason.  I'm sad because of my family situation.  She has maintained sobriety over the interim.  She denies suicidal ideation.     9/26/2022: Pt's last visit was 3/2/22. In the interim she discontinued all medications after an increase in etoh intake beginning approx 9 months ago that peaked 2 months ago. Pt notes she has decreased etoh intake since that time and now only occasionally has a drink. Pt reports she would like a re-evaluation of previous diagnoses and notes she has research bipolar disorder and borderline personality disorder.  She feels like her symptoms represent borderline personality disorder.  She does note she was starting individual " "psychotherapy through her school on .    HYPOMANIA SCREEN      A. A distinct period of abnormally and persistently elevated, expansive, or irritable mood and abnormally and persistently increased activity or energy, lasting at least four consecutive days and present most of the day, nearly every day. Denies elevated mood lasting more than 1 day    B. During the period of mood disturbance and increased energy and activity, three (or more) of the following symptoms (four if the mood is only irritable) have persisted, represent a noticeable change from usual behavior, and have been present to a significant degree:    1) Inflated self-esteem or grandiosity. "Attention at the bar makes me feel good"  2) Decreased need for sleep (eg, feels rested after only three hours of sleep): denies  3) More talkative than usual or pressure to keep talking. "It depends"  4) Flight of ideas or subjective experience that thoughts are racing. More than usual lately  5) Distractibility "Always"  6) Increase in goal-directed activity (either socially, at work or school, or sexually) or psychomotor agitation (ie, purposeless non-goal-directed activity). denies  7) Excessive involvement in activities that have a high potential for painful consequences  (unrestrained buying sprees, sexual indiscretions, or foolish business investments). Drinking, buying sprees "not bad, but more than I should have been spending", recently impulsively dyed hair pink      BORDERLINE PERSONALITY DISORDER SCREEN  A pervasive pattern of instability of interpersonal relationships, self-image, and affects, and marked impulsivity, beginning by early adulthood and present in a variety of contexts, as indicated by five (or more) of the followin. Frantic efforts to avoid real or imagined abandonment - endorses, recently  from partner of 6 years and the father of her child  2. A pattern of unstable and intense interpersonal relationships " "characterized by alternating between extremes of idealization and devaluation. Endorses "I feel like I play victim a lot" "I can be selfish when I'm triggered."   3. Identity disturbance: markedly and persistently unstable self-image or sense of self. Endorses "I feel like my personality changes depending on my situation"  4. Impulsivity in at least two areas that are potentially self-damaging (eg, spending, sex, substance abuse, reckless driving, binge eating). (Note: Do not include suicidal or self-mutilating behavior covered in Criterion 5.) "hooking up with people" (cheated on partner of 6 years), drinking, spending money, dyed hair pink, not attending school recently  5. Recurrent suicidal behavior, gestures, or threats, or self-mutilating behavior - "fleeting" suicidal thoughts  6. Affective instability due to a marked reactivity of mood (eg, intense episodic dysphoria, irritability, or anxiety usually lasting a few hours and only rarely more than a few days). Endorses - reports rapid mood swings, crying spells, "focusing on things for hours - that's all that matters at the time"  7. Chronic feelings of emptiness - endorses  8. Inappropriate, intense anger or difficulty controlling anger (eg, frequent displays of temper, constant anger, recurrent physical fights). endorses  9. Transient, stress-related paranoid ideation or severe dissociative symptoms - denies      Depression:    Mood: +depressed mood  Interest/pleasure/anhedonia: +  Guilt/worthlssness:  +  SI: no  Sleep: "ok", 6-7 hours  Energy: fatigued, "even when I get sleep it doesn't do anything for me"   Appetite/weight: normal appetite, but recent wt loss of 5 lbs  Concentration/indecisiveness: decreased  Decreased motivation - hasnt gone to school   Psychomotor activity: no  S.I.B.s/risky behavior: no    Initial HPI: Pt. is a 26 y.o. female, with a past psychiatric hx of anxiety and mood disorder presenting to the clinic for an initial evaluation and " "treatment. Past medical history detailed below. She reports she has struggled with depression and anxiety for "years." She was seen by child psychiatrist Dariusz Jimenez MD from [id number] during which time she was initially diagnosed with bipolar disorder, but she reports this diagnosis was later changed to mood disorder. She denies ever having symptoms of shan including grandiosity, decreased need for sleep, excessive talking, distractibility, increase in goal-directed activity, or recklessness. She reports that her mood is depressed at baseline and she occasionally has periods where it is lower than normal. She reports a period of approximately 2 months after the birth of her son in October of 2020 during which she experienced markedly depressed mood and anxiety. She did not seek treatment at that time; she has not sought treatment since terminating with Dr. Jimenez in 2014. She is not currently taking any medications. At this time she reports symptoms of depressed mood, anhedonia, fatigue, feelings of guilt and worthlessness, fatigue, and concentration problems. She also reports excessive anxiety and worry which she has difficulty controlling, restlessness, irritability, and headaches, as well as anxiety around groups of people, including classes at school and the grocery store, and dwelling on conversations she has had. She also reports frequent negative thinking and worst case scenario thinking. These symptoms have been consistent for "years." She reports she is under a great deal of stress related to her relationship with her son's father. She further states she would like pharmacological treatment for her depressed mood and anxiety. She states she does not wish to take SSRI's as she did not like the way they made her feel. She states she has done well on Lamictal in the past and would like to try Lamictal again.     Initial Psych ROS:  Depression: no appetite/weight changes, insomnia/hypersomnia, " "difficulty concentrating, or recurrent thoughts of death or SI  Sleep: Sleeps 8  hours a night on average. Wakes up fatigued.  Sallie: No episodes of expansive mood, decreased need for sleep, increased goal directed behaviors, or racing thoughts  Anxiety: no panic attacks, agoraphobia, phobias, avoidance, or somatic related complaints   OCD: no obsessions or compulsive behaviors   PTSD: no flashbacks, nightmares, or avoidance of stimuli  Psychosis: no current A/V hallucinations or delusions - She does report feeling as though she "was being haunted by things in closet, I would talk to it," but only after beginning an unknown multidrug regimen in 2013  SI/HI - passive SI, reports fleeting thoughts such as "What it I  wasn't here?" However she states, "I would never do that." Denies a plan or suicidal intent. Reports she has her son to live for. Agrees to call the clinic or 911 if she develops SI with intent or plan    Past Psychiatric History:  Past Psych Hx: anxiety and mood disorder  Dr Jimenez in 2013  First psych contact: inpatient psych admission at [facility] in 2013 after attempted suicide by unknown means, possible overdose     Prior hospitalizations: 2-3 times at [facility] for suicide attempts; she reports she has difficulty remembering this part of her life and cannot remember the method of theses attempts "maybe pills"  Prior suicide attempts or self-harm: 2-3, see above; denies cutting  Prior diagnosis: bipolar disorder, social phobia  Prior meds: Prozac, Lamictal, other unknown medications  Current meds: none  Prior psychotherapy: 1 x month with  for last year      Past Medical Hx:   Past Medical History:   Diagnosis Date    History of psychiatric care     History of psychiatric hospitalization     Self-harming behavior     Suicide attempt    Hx of TBI: no      Hx of seizures: no     Family Hx:   Paternal: father hx of ADHD; several family members with alcohol use disorder "   Maternal: mother - undiagnosed but anxious, irritable  Siblings: 1 sister with similar symptoms of anxiety    Social Hx:   Childhood: Born in [place], raised in [place]  Marital Status: never   Children: 1 year old sonDevante  Resides: [place]  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[employer]; student  Hobbies: none currently, she reports she is trying to read again, however between her 1 year old and school, she does not have much time for herself  Latter day:  denies  Education level: some college, currently attending [employer] majoring in social work, expected graduation date 2023  : denies   Legal: denies  Access to guns: denies     Substance Hx:  Caffeine: Coffee 3-4 cups per coffee/day if she is at work or school  Tobacco:  former smoker, quit in Jan 2020  Alcohol: occasional, approximately once every few months, 2-3 drinks per occasion  Drug use: no  Rehab: no  Prior/current AA: no    Interim hx:     Medication changes last visit: 11/21/2022  Continue Lamictal 200 mg p.o. daily for mood  Continue Wellbutrin  mg po q.a.m. for anxiety and mood    10/24/2022  Continue typical titration of Lamictal for mood, 100 mg p.o. daily times 14 days, then 200 mg p.o. daily  Increase Wellbutrin XL to 300 mg po q.a.m. for anxiety and mood    9/26/2022  Start typical titration of Lamictal for mood, 25 mg p.o. daily times 14 days, then 50 mg p.o. daily times 14 days, then 100 mg p.o. daily times 14 days, then 200 mg p.o. daily  Start Wellbutrin  mg po q.a.m. for anxiety and mood  Referral placed for dialectical behavior therapy    Alcohol/Drugs/Caffeine/Tobacco: No change in consumption    Review of Systems   PSYCHIATRIC: Pertinant items are noted in the narrative.  All other systems reviewed and found to be negative    Past Medical, Family and Social History: The patient's past medical, family and social history have been reviewed and updated as appropriate within the electronic medical record -  "see encounter notes.    Adherence: yes    Side effects: None    Risk Parameters:  Patient reports no suicidal ideation  Patient reports no homicidal ideation  Patient reports no self-injurious behavior  Patient reports no violent behavior    Exam   Constitutional  Vitals:  Most recent vital signs, dated greater than 90 days prior to this appointment, were reviewed.   Last 3 sets of Vitals    Vitals - 1 value per visit 9/7/2022 9/7/2022 1/7/2023   SYSTOLIC - 118 103   DIASTOLIC - 86 74   Pulse - - 92   Temp - - 98   Resp - - 16   SPO2 - - 98   Weight (lb) - 175.93 153.66   Weight (kg) - 79.8 69.7   Height - - 64   BMI (Calculated) - - 26.4   VISIT REPORT - - -   Pain Score  0 - -          General:  unremarkable, age appropriate, normal weight, well nourished, casually dressed     Musculoskeletal  Muscle Strength/Tone:  Unable to assess due to nature of virtual visit   Gait & Station:  Unable to assess due to nature of virtual visit     Psychiatric  Speech:  no latency; no press   Mood & Affect:  "Good"  congruent and appropriate   Thought Process:  normal and logical   Associations:  intact   Thought Content:  normal, no suicidality, no homicidality, delusions, or paranoia   Insight:  intact   Judgement: behavior is adequate to circumstances   Orientation:  grossly intact   Memory: intact for content of interview   Language: grossly intact   Attention Span & Concentration:  able to focus   Fund of Knowledge:  intact and appropriate to age and level of education     Suicide Risk Assessment:  Protective factors: age, gender, no ongoing substance abuse, no psychosis, has a son, denies active SI/intent/plan, seeking treatment, access to treatment, future oriented, good primary support, no access to firearms  Risks: 2-3 prior attempts, 2-3 prior hospitalizations, ongoing depression and anxiety  Patient is a low immediate and long-term risk considering risk factors.     Assessment and Diagnosis   Status/Progress: Based on " the examination today, the patient's problem(s) is/are well controlled.  New problems have not been presented today.   Co-morbidities are not complicating management of the primary condition.  The working differential for this patient includes bipolar disorder, mood disorder NOS, social anxiety, intermittent explosive disorder.     General Impression: Pt is a 27 y.o. female with past history of anxiety and mood disorder presenting today for a follow up visit.  Patient reports mood and anxiety are well controlled.  She does report continued insomnia.  Discussed benefits, risks, and side effects of doxepin with patient who verbalized understanding and wishes to initiate a trial.  Patient agrees to contact me with any side effects or worsening symptoms.    11/21/2022:  Symptoms of depressed mood and anxiety are well controlled on current medication regimen.  Through shared decision making, pharmacological intervention will be continued.    10/24/2022: Patient reports improvement in mood and motivation after restarting lamotrigine and Wellbutrin XL at last visit.  She has maintained sobriety over the interim.  She does requests an increase in Wellbutrin dose.  Through shared decision-making Wellbutrin XL will be increased to 300 mg p.o. q.a.m. for mood and anxiety.  Patient will be starting lamotrigine 100 mg daily dose.  We will follow up in 4 weeks.    9/26/2022: Patient reports she discontinued all psychotropic medications after our last visit and has been drinking in excess.  Today, patient endorses multiple symptoms of borderline personality disorder.  As Lamictal and Wellbutrin provided good relief of symptoms previously we will restart these medications today.  Discussed dialectical behavior therapy as the gold standard treatment for borderline personality disorder with patient who verbalized understanding and agrees to treatment plan.    Safe for outpatient follow up and no acute safety concerns.    Encounter  Diagnoses   Name Primary?    Recurrent major depressive disorder, in partial remission Yes    TAZ (generalized anxiety disorder)     Borderline personality disorder     Insomnia, unspecified type          Intervention/Counseling/Treatment Plan   Medication Management: The risks and benefits of medication were discussed with the patient. Shared decision making occurred   The treatment plan and follow up plan were reviewed with the patient.     Continue Lamictal 200 mg p.o. daily for mood  Continue Wellbutrin  mg po q.a.m. for anxiety and mood  Start doxepin 10 mg p.o. q.h.s. p.r.n. insomnia    Recommend individual psychotherapy  Call to report any worsening of symptoms or problems with the medication. Pt instructed to go to ER with thoughts of harming self, others  Labs: no new orders      Psychotherapy:   Target symptoms: depression, anxiety   Outcome monitoring methods: self-report, observation  Therapeutic Intervention Type: supportive psychotherapy  Why chosen therapy is appropriate versus another modality: relevant to diagnosis, patient responds to this modality  Patient's response to intervention:The patient's response to intervention is accepting.  Progress toward goals: The patient's progress toward goals is good.  Topics discussed/themes: building skills sets for symptom management, symptom recognition, nutrition, exercise  Duration of intervention: 10 minutes    Return to Clinic: 1 month in person    -Pt given contact number for psychotherapists at [facility] ans also instructed they may check with insurance for a list of providers  -Spent 30min face to face with the pt; >50% time spent in counseling   -Supportive therapy and psychoeducation provided  -R/B/SE's of medications discussed with the pt who expresses understanding and chooses to take medications as prescribed.   -Pt instructed to call clinic, 911 or go to nearest emergency room if sxs worsen or pt is in   crisis. The pt expresses  understanding.    EVI Turner, PMHNP-BC  Department of Psychiatry - [facility] [address]  [place] [address]  Office: [phone]  Fax: [phone]

## 2023-01-31 ENCOUNTER — TELEPHONE (OUTPATIENT)
Dept: PSYCHIATRY | Facility: CLINIC | Age: 28
End: 2023-01-31
Payer: COMMERCIAL

## 2023-01-31 NOTE — TELEPHONE ENCOUNTER
Spoke to the patient and she will not be able to attend the DBT beginners group as it conflicts with her class schedule. She states she is not currently interested in a future group session at this time

## 2023-02-24 ENCOUNTER — PATIENT MESSAGE (OUTPATIENT)
Dept: PSYCHIATRY | Facility: CLINIC | Age: 28
End: 2023-02-24
Payer: COMMERCIAL

## 2023-04-28 ENCOUNTER — PATIENT MESSAGE (OUTPATIENT)
Dept: PSYCHIATRY | Facility: CLINIC | Age: 28
End: 2023-04-28
Payer: COMMERCIAL

## 2023-04-29 ENCOUNTER — TELEPHONE (OUTPATIENT)
Dept: PSYCHIATRY | Facility: CLINIC | Age: 28
End: 2023-04-29
Payer: COMMERCIAL

## 2023-04-29 NOTE — TELEPHONE ENCOUNTER
Client reached out to clinician about individual DBT classes.  Messaged client to sign up for the fall semester if she is amenable to same.  Otherwise to find a therapist to work with her regardless of whether she signs up for group skills training or not as clinician does not do individual DBT training.  Client informed of same.

## 2023-05-19 DIAGNOSIS — F33.41 RECURRENT MAJOR DEPRESSIVE DISORDER, IN PARTIAL REMISSION: ICD-10-CM

## 2023-05-19 NOTE — TELEPHONE ENCOUNTER
Last fill for wellbutrin 4/18/23   Last fill for sinequan 4/20/23   Nov none   Lov 1/17/23   Messaging patient to schedule an in person appt

## 2023-05-22 RX ORDER — DOXEPIN HYDROCHLORIDE 10 MG/1
CAPSULE ORAL
Qty: 30 CAPSULE | Refills: 0 | Status: SHIPPED | OUTPATIENT
Start: 2023-05-22 | End: 2024-03-28 | Stop reason: ALTCHOICE

## 2023-05-22 RX ORDER — BUPROPION HYDROCHLORIDE 300 MG/1
TABLET ORAL
Qty: 30 TABLET | Refills: 0 | Status: SHIPPED | OUTPATIENT
Start: 2023-05-22

## 2023-06-19 ENCOUNTER — OFFICE VISIT (OUTPATIENT)
Dept: URGENT CARE | Facility: CLINIC | Age: 28
End: 2023-06-19
Payer: COMMERCIAL

## 2023-06-19 VITALS
WEIGHT: 152 LBS | HEART RATE: 90 BPM | BODY MASS INDEX: 25.95 KG/M2 | SYSTOLIC BLOOD PRESSURE: 125 MMHG | TEMPERATURE: 98 F | HEIGHT: 64 IN | OXYGEN SATURATION: 97 % | DIASTOLIC BLOOD PRESSURE: 79 MMHG

## 2023-06-19 DIAGNOSIS — J32.9 CLINICAL SINUSITIS: Primary | ICD-10-CM

## 2023-06-19 PROCEDURE — 99213 OFFICE O/P EST LOW 20 MIN: CPT | Mod: S$GLB,,, | Performed by: PHYSICIAN ASSISTANT

## 2023-06-19 PROCEDURE — 99213 PR OFFICE/OUTPT VISIT, EST, LEVL III, 20-29 MIN: ICD-10-PCS | Mod: S$GLB,,, | Performed by: PHYSICIAN ASSISTANT

## 2023-06-19 RX ORDER — AMOXICILLIN AND CLAVULANATE POTASSIUM 875; 125 MG/1; MG/1
1 TABLET, FILM COATED ORAL 2 TIMES DAILY
Qty: 20 TABLET | Refills: 0 | Status: SHIPPED | OUTPATIENT
Start: 2023-06-19 | End: 2023-06-29

## 2023-06-19 NOTE — PROGRESS NOTES
"Subjective:      Patient ID: Mirella Kline is a 27 y.o. female.    Vitals:  height is 5' 4" (1.626 m) and weight is 68.9 kg (152 lb). Her oral temperature is 98.2 °F (36.8 °C). Her blood pressure is 125/79 and her pulse is 90. Her oxygen saturation is 97%.     Chief Complaint: Cough    Pt present today c/o cough and possible left ear infection. Pt says she been on and off sick for 3 weeks now, taking otc meds with mild relief.    Cough  This is a recurrent problem. The current episode started 1 to 4 weeks ago. The problem has been unchanged. The problem occurs constantly. The cough is Non-productive. Associated symptoms include headaches, nasal congestion and postnasal drip. Pertinent negatives include no chills, fever or shortness of breath. Nothing aggravates the symptoms. She has tried OTC cough suppressant for the symptoms. The treatment provided mild relief.     Constitution: Negative for chills and fever.   HENT:  Positive for congestion, postnasal drip and sinus pressure.    Respiratory:  Positive for cough. Negative for shortness of breath.    Neurological:  Positive for headaches.    Objective:     Physical Exam   Constitutional: She does not appear ill. No distress.   HENT:   Head: Normocephalic and atraumatic.   Ears:   Right Ear: Tympanic membrane, external ear and ear canal normal.   Left Ear: Tympanic membrane, external ear and ear canal normal.   Nose: Right sinus exhibits no maxillary sinus tenderness and no frontal sinus tenderness. Left sinus exhibits no maxillary sinus tenderness and no frontal sinus tenderness.   Mouth/Throat: Mucous membranes are moist. No oropharyngeal exudate or posterior oropharyngeal erythema. Oropharynx is clear.   Eyes: Conjunctivae are normal. Right eye exhibits no discharge. Left eye exhibits no discharge. Extraocular movement intact   Cardiovascular: Normal rate, regular rhythm and normal heart sounds.   No murmur heard.  Pulmonary/Chest: Effort normal and breath " sounds normal. She has no wheezes. She has no rhonchi. She has no rales.   Abdominal: Normal appearance.   Musculoskeletal: Normal range of motion.         General: Normal range of motion.   Neurological: no focal deficit. She is alert.   Skin: Skin is warm, dry and not pale. jaundice  Psychiatric: Her behavior is normal. Mood, judgment and thought content normal.   Nursing note and vitals reviewed.    Assessment:     1. Clinical sinusitis        Plan:       Clinical sinusitis    Other orders  -     amoxicillin-clavulanate 875-125mg (AUGMENTIN) 875-125 mg per tablet; Take 1 tablet by mouth 2 (two) times daily. for 10 days  Dispense: 20 tablet; Refill: 0      Sinusitis in Adults   The Basics   Written by the doctors and editors at [facility]   What is sinusitis? -- Sinusitis is a condition that can cause a stuffy nose, pain in the face, and discharge (mucus) from the nose. The sinuses are hollow areas in the bones of the face (figure 1). They have a thin lining that normally makes a small amount of mucus. When this lining gets irritated or infected, it swells and makes extra mucus. This causes symptoms.  Sinusitis can occur when a person gets sick with a cold. The germs causing the cold can also infect the sinuses. Many times, a person feels like their cold is getting better. But then they get sinusitis and begin to feel sick again.  What are the symptoms of sinusitis? -- Common symptoms of sinusitis include:  Stuffy or blocked nose  Thick white, yellow, or green discharge from the nose  Pain in the teeth  Pain or pressure in the face - This often feels worse when a person bends forward.  People with sinusitis can also have other symptoms that include:  Fever  Cough  Trouble smelling  Ear pressure or fullness  Headache  Bad breath  Feeling tired  Most of the time, symptoms start to improve in 7 to 10 days.  Should I see a doctor or nurse? -- See your doctor or nurse if your symptoms last more than 10 days, or if your  symptoms first get better but then get worse.  Rarely, sinusitis can lead to serious problems. See your doctor or nurse right away (do not wait 10 days) if you have:  Fever higher than 102°F (38.9°C)  Sudden and severe pain in the face and head  Trouble seeing or seeing double  Trouble thinking clearly  Swelling or redness around one or both eyes  A stiff neck  Is there anything I can do on my own to feel better? -- Yes. To reduce your symptoms, you can:  Take an over-the-counter pain reliever to reduce the pain  Rinse your nose and sinuses with salt water a few times a day - Ask your doctor or nurse about the best way to do this.  Your doctor might also prescribe a steroid nose spray to reduce the swelling in your nose. (These kinds of steroid nose sprays are safe to take, and do not contain the same steroids that some athletes take illegally.)  How is sinusitis treated? -- Most of the time, sinusitis does not need to be treated with antibiotic medicines. This is because most sinusitis is caused by viruses, not bacteria, and antibiotics do not kill viruses. In fact, even sinusitis caused by bacteria will usually get better on its own without antibiotics.   Some people with sinusitis do need treatment with antibiotics. If your symptoms have not improved after 10 days, ask your doctor if you should take antibiotics. Your doctor might recommend that you wait 1 more week to see if your symptoms improve. But if you have symptoms such as a fever or a lot of pain, they might prescribe antibiotics. It is important to follow your doctor's instructions about taking your antibiotics.  What if my symptoms do not get better? -- If your symptoms do not get better, talk with your doctor or nurse. They might order tests to figure out why you still have symptoms. These can include:  CT scan or other imaging tests - Imaging tests create pictures of the inside of the body.  A test to look inside the sinuses - For this test, a doctor  "puts a thin tube with a camera on the end into the nose and up into the sinuses.  Some people get a lot of sinus infections or have symptoms that last at least 3 months. These people can have a different type of sinusitis called "chronic sinusitis." Chronic sinusitis can be caused by different things. For example, some people have growths inside their nose or sinuses that are called "polyps." Other people have allergies that cause their symptoms.  Chronic sinusitis can be treated in different ways. If you have chronic sinusitis, talk with your doctor about which treatments are right for you.  All topics are updated as new evidence becomes available and our peer review process is complete.  This topic retrieved from [employer] on: Sep 21, 2021.  Topic 55875 Version 17.0  Release: 29.4.2 - C29.263  © 2021 UpToDate, Inc. and/or its affiliates. All rights reserved.  figure 1: Sinuses of the face     This drawing shows the sinuses of the face, from the side and front views.  Graphic 532562 Version 1.0     Consumer Information Use and Disclaimer   This information is not specific medical advice and does not replace information you receive from your health care provider. This is only a brief summary of general information. It does NOT include all information about conditions, illnesses, injuries, tests, procedures, treatments, therapies, discharge instructions or life-style choices that may apply to you. You must talk with your health care provider for complete information about your health and treatment options. This information should not be used to decide whether or not to accept your health care provider's advice, instructions or recommendations. Only your health care provider has the knowledge and training to provide advice that is right for you. The use of this information is governed by the [employer] End User License Agreement, available at https://www.[employer].Vinny/en/solutions/[employer]/about/reji.The use of " [employer] content is governed by the [employer] Terms of Use. ©2021 [employer] All rights reserved.  Copyright   © 2021 [employer] and/or its affiliates. All rights reserved.

## 2023-10-06 ENCOUNTER — PATIENT MESSAGE (OUTPATIENT)
Dept: OBSTETRICS AND GYNECOLOGY | Facility: CLINIC | Age: 28
End: 2023-10-06
Payer: COMMERCIAL

## 2023-10-06 RX ORDER — DROSPIRENONE AND ETHINYL ESTRADIOL 0.02-3(28)
1 KIT ORAL DAILY
Qty: 90 TABLET | Refills: 2 | OUTPATIENT
Start: 2023-10-06 | End: 2024-10-05

## 2023-10-06 NOTE — TELEPHONE ENCOUNTER
Informed patient that appointment is scheduled and rx refill request was sent to provider until appointment. Patient verbalized understanding.    ----- Message from Kyle Montez sent at 10/6/2023 12:47 PM CDT -----  Contact: pt  Type: Needs Medical Advice  Who Called:  pt  Best Call Back Number: [phone]    Additional Information: Pt is calling the office needs Birth control refilled she has been requesting haven't gotten a response from anyone.Please call back and advise.    [facility]/pharmacy #[id number] - [place] - [address] [address]  [address] [place] [address]  Phone: [phone] Fax: [phone]

## 2023-10-07 ENCOUNTER — TELEPHONE (OUTPATIENT)
Dept: OBSTETRICS AND GYNECOLOGY | Facility: CLINIC | Age: 28
End: 2023-10-07
Payer: COMMERCIAL

## 2023-10-07 RX ORDER — DROSPIRENONE AND ETHINYL ESTRADIOL 0.02-3(28)
1 KIT ORAL DAILY
Qty: 90 TABLET | Refills: 0 | Status: SHIPPED | OUTPATIENT
Start: 2023-10-07 | End: 2024-01-05

## 2023-10-08 NOTE — TELEPHONE ENCOUNTER
----- Message from Roxana Torres LPN sent at 10/6/2023  4:04 PM CDT -----  Contact: pt  Patient needs refill until appointment.  Appointment: 11/27/23.  Markel moreno next week.  ----- Message -----  From: Kyle Montez  Sent: 10/6/2023  12:49 PM CDT  To: [facility] Staff    Type: Needs Medical Advice  Who Called:  pt  Best Call Back Number: [phone]    Additional Information: Pt is calling the office needs Birth control refilled she has been requesting haven't gotten a response from anyone.Please call back and advise.    [facility]/pharmacy #[id number] - [place] - [address] [address]  [address] [place] [address]  Phone: [phone] Fax: [phone]

## 2023-12-11 ENCOUNTER — TELEPHONE (OUTPATIENT)
Dept: OBSTETRICS AND GYNECOLOGY | Facility: CLINIC | Age: 28
End: 2023-12-11
Payer: COMMERCIAL

## 2023-12-13 ENCOUNTER — PATIENT MESSAGE (OUTPATIENT)
Dept: OBSTETRICS AND GYNECOLOGY | Facility: CLINIC | Age: 28
End: 2023-12-13
Payer: COMMERCIAL

## 2024-01-05 RX ORDER — DROSPIRENONE AND ETHINYL ESTRADIOL TABLETS 0.02-3(28)
1 KIT ORAL
Qty: 28 TABLET | Refills: 0 | Status: SHIPPED | OUTPATIENT
Start: 2024-01-05

## 2024-01-05 NOTE — TELEPHONE ENCOUNTER
Refill Routing Note   Medication(s) are not appropriate for processing by [facility] for the following reason(s):        Patient not seen by provider within 15 months  Outside of protocol(smoker)    [facility] action(s):  Defer        Medication Therapy Plan: ACtive smoker is outside protocol      Appointments  past 12m or future 3m with PCP    Date Provider   Last Visit   9/7/2022 Santos Taylor MD   Next Visit   Visit date not found Santos Taylor MD   ED visits in past 90 days: 0        Note composed:9:23 AM 01/05/2024

## 2024-03-27 RX ORDER — DROSPIRENONE AND ETHINYL ESTRADIOL TABLETS 0.02-3(28)
1 KIT ORAL
Qty: 28 TABLET | Refills: 0 | OUTPATIENT
Start: 2024-03-27

## 2024-03-27 NOTE — TELEPHONE ENCOUNTER
Refill Routing Note   Medication(s) are not appropriate for processing by [facility] for the following reason(s):        Patient not seen by provider within 15 months  Other( FOVS with Dr. Miguel tomorrow)    [facility] action(s):  Defer        Medication Therapy Plan: VUS tomorrow with Dr. Miguel      Appointments  past 12m or future 3m with PCP    Date Provider   Last Visit   9/7/2022 Santos Taylor MD   Next Visit   Visit date not found Santos Taylor MD   ED visits in past 90 days: 0        Note composed:1:52 PM 03/27/2024

## 2024-03-28 ENCOUNTER — OFFICE VISIT (OUTPATIENT)
Dept: OBSTETRICS AND GYNECOLOGY | Facility: CLINIC | Age: 29
End: 2024-03-28
Payer: MEDICAID

## 2024-03-28 VITALS
BODY MASS INDEX: 27.06 KG/M2 | DIASTOLIC BLOOD PRESSURE: 74 MMHG | SYSTOLIC BLOOD PRESSURE: 100 MMHG | WEIGHT: 157.63 LBS

## 2024-03-28 DIAGNOSIS — Z30.9 ENCOUNTER FOR CONTRACEPTIVE MANAGEMENT, UNSPECIFIED TYPE: ICD-10-CM

## 2024-03-28 DIAGNOSIS — Z01.419 WELL WOMAN EXAM: Primary | ICD-10-CM

## 2024-03-28 PROCEDURE — 3008F BODY MASS INDEX DOCD: CPT | Mod: CPTII,,, | Performed by: STUDENT IN AN ORGANIZED HEALTH CARE EDUCATION/TRAINING PROGRAM

## 2024-03-28 PROCEDURE — 1159F MED LIST DOCD IN RCRD: CPT | Mod: CPTII,,, | Performed by: STUDENT IN AN ORGANIZED HEALTH CARE EDUCATION/TRAINING PROGRAM

## 2024-03-28 PROCEDURE — 3078F DIAST BP <80 MM HG: CPT | Mod: CPTII,,, | Performed by: STUDENT IN AN ORGANIZED HEALTH CARE EDUCATION/TRAINING PROGRAM

## 2024-03-28 PROCEDURE — 3074F SYST BP LT 130 MM HG: CPT | Mod: CPTII,,, | Performed by: STUDENT IN AN ORGANIZED HEALTH CARE EDUCATION/TRAINING PROGRAM

## 2024-03-28 PROCEDURE — 99999 PR PBB SHADOW E&M-EST. PATIENT-LVL III: CPT | Mod: PBBFAC,,, | Performed by: STUDENT IN AN ORGANIZED HEALTH CARE EDUCATION/TRAINING PROGRAM

## 2024-03-28 PROCEDURE — 99395 PREV VISIT EST AGE 18-39: CPT | Mod: S$PBB,,, | Performed by: STUDENT IN AN ORGANIZED HEALTH CARE EDUCATION/TRAINING PROGRAM

## 2024-03-28 PROCEDURE — 99213 OFFICE O/P EST LOW 20 MIN: CPT | Mod: PBBFAC,PN | Performed by: STUDENT IN AN ORGANIZED HEALTH CARE EDUCATION/TRAINING PROGRAM

## 2024-03-28 RX ORDER — DROSPIRENONE AND ETHINYL ESTRADIOL 0.02-3(28)
1 KIT ORAL DAILY
Qty: 90 TABLET | Refills: 3 | Status: SHIPPED | OUTPATIENT
Start: 2024-03-28

## 2024-03-28 NOTE — PROGRESS NOTES
History & Physical  Gynecology      SUBJECTIVE:     Chief Complaint: Annual Exam (Rx refill)       History of Present Illness:    Here today for f/u on OCPS and annual exam.     Gyn: regular periods on OCPs. No hx of STI. No hx of abnormal pap. No immediate FH of gyn or colon cancer. Had 2 dose of HPV     Still vaping       Review of patient's allergies indicates:  No Known Allergies    Past Medical History:   Diagnosis Date    History of psychiatric care     History of psychiatric hospitalization     Self-harming behavior     Suicide attempt      Past Surgical History:   Procedure Laterality Date    ADENOIDECTOMY      TONSILLECTOMY       OB History          1    Para   1    Term   1       0    AB   0    Living   1         SAB   0    IAB   0    Ectopic   0    Multiple   0    Live Births   1               Family History   Problem Relation Age of Onset    Diabetes Paternal Grandmother     Bipolar disorder Neg Hx     Schizophrenia Neg Hx     Breast cancer Neg Hx     Ovarian cancer Neg Hx     Uterine cancer Neg Hx     Colon cancer Neg Hx      Social History     Tobacco Use    Smoking status: Light Smoker     Types: Vaping with nicotine    Smokeless tobacco: Never   Substance Use Topics    Alcohol use: Yes     Comment: social     Drug use: Never       Current Outpatient Medications   Medication Sig    buPROPion (WELLBUTRIN XL) 300 MG 24 hr tablet TAKE 1 TABLET BY MOUTH EVERY DAY (Patient not taking: Reported on 3/28/2024)    drospirenone-ethinyl estradioL (LORYNA, 28,) 3-0.02 mg per tablet Take 1 tablet by mouth once daily.    lamoTRIgine (LAMICTAL) 200 MG tablet TAKE 1 TABLET BY MOUTH EVERY DAY (Patient not taking: Reported on 3/28/2024)     No current facility-administered medications for this visit.         Review of Systems:  Review of Systems   Constitutional:  Negative for chills, fatigue and fever.   HENT:  Negative for congestion.    Eyes:  Negative for visual disturbance.   Respiratory:  Negative  for cough and shortness of breath.    Cardiovascular:  Negative for chest pain and palpitations.   Gastrointestinal:  Negative for abdominal distention, abdominal pain, constipation, diarrhea, nausea and vomiting.   Genitourinary:  Negative for difficulty urinating, dysuria, hematuria, vaginal bleeding and vaginal discharge.   Skin:  Negative for rash.   Neurological:  Negative for dizziness, seizures, light-headedness and headaches.   Hematological:  Does not bruise/bleed easily.   Psychiatric/Behavioral:  Negative for dysphoric mood. The patient is not nervous/anxious.         OBJECTIVE:     Physical Exam:  Physical Exam  Vitals reviewed.   Constitutional:       General: She is not in acute distress.     Appearance: She is well-developed.   HENT:      Head: Normocephalic and atraumatic.   Cardiovascular:      Rate and Rhythm: Normal rate and regular rhythm.   Pulmonary:      Effort: Pulmonary effort is normal.   Chest:   Breasts:     Right: No inverted nipple, mass, nipple discharge, skin change or tenderness.      Left: No inverted nipple, mass, nipple discharge, skin change or tenderness.   Abdominal:      General: There is no distension.      Palpations: Abdomen is soft.   Genitourinary:     Vagina: Normal.      Comments: Normal external female genitalia, normal hair distribution. Vaginal mucosa pink, moist, well rugated, scant white physiologic discharge. No blood in vault. Cervix pink, non-friable, without lesion. No CMT. Uterus non tender, mobile, not enlarged. Adnexa without fullness or tenderness.    Skin:     General: Skin is warm.   Neurological:      Mental Status: She is alert and oriented to person, place, and time.   Psychiatric:         Behavior: Behavior normal.         Thought Content: Thought content normal.         Judgment: Judgment normal.           ASSESSMENT:       ICD-10-CM ICD-9-CM    1. Well woman exam  Z01.419 V72.31       2. Encounter for contraceptive management, unspecified type   Z30.9 V25.9 drospirenone-ethinyl estradioL (LORYNA, 28,) 3-0.02 mg per tablet          No orders of the defined types were placed in this encounter.          Plan:      - Pap due 2025, last 2022  - tobacco cessation n/a  - contraception OCPs, discussed IUD, declines   - HPV vaccine s/p   - Safe Sex discussed    - Counseled to take daily multivitamin. If patient is of reproductive age and not on contraception, to take prenatal vitamin. Patient has been counseled on the vitamin D and calcium requirements per ACOG recommendations.  Age   Calcium(mg/day)   Vitamin D (IU/day)  9-18    1300                       600  19-50  1000                       600  51-70  1200                       600  >70      1,200                      800  Patient to start vitamin   - Covid vaccine n/a  - Discussed IPV, feels safe   - recommend POP given tobacco use, understands     Rani Jean Baptiste M.D.  Obstetrics and Gynecology

## 2024-04-16 ENCOUNTER — TELEPHONE (OUTPATIENT)
Dept: PSYCHIATRY | Facility: CLINIC | Age: 29
End: 2024-04-16
Payer: MEDICAID

## 2024-04-16 NOTE — TELEPHONE ENCOUNTER
Called patient and lvm about how her appt needs to be in person not virtual with rj nazario        Patient called back and switched appt from virtual to in person on 4/18/24

## 2024-04-18 ENCOUNTER — OFFICE VISIT (OUTPATIENT)
Dept: PSYCHIATRY | Facility: CLINIC | Age: 29
End: 2024-04-18
Payer: MEDICAID

## 2024-04-18 VITALS
DIASTOLIC BLOOD PRESSURE: 70 MMHG | HEART RATE: 93 BPM | SYSTOLIC BLOOD PRESSURE: 91 MMHG | BODY MASS INDEX: 26.98 KG/M2 | HEIGHT: 64 IN | WEIGHT: 158.06 LBS

## 2024-04-18 DIAGNOSIS — F41.1 GAD (GENERALIZED ANXIETY DISORDER): ICD-10-CM

## 2024-04-18 DIAGNOSIS — G47.00 INSOMNIA, UNSPECIFIED TYPE: ICD-10-CM

## 2024-04-18 DIAGNOSIS — F60.3 BORDERLINE PERSONALITY DISORDER: ICD-10-CM

## 2024-04-18 DIAGNOSIS — F33.0 MILD EPISODE OF RECURRENT MAJOR DEPRESSIVE DISORDER: Primary | ICD-10-CM

## 2024-04-18 PROCEDURE — 1160F RVW MEDS BY RX/DR IN RCRD: CPT | Mod: CPTII,,,

## 2024-04-18 PROCEDURE — 3008F BODY MASS INDEX DOCD: CPT | Mod: CPTII,,,

## 2024-04-18 PROCEDURE — 3074F SYST BP LT 130 MM HG: CPT | Mod: CPTII,,,

## 2024-04-18 PROCEDURE — 1159F MED LIST DOCD IN RCRD: CPT | Mod: CPTII,,,

## 2024-04-18 PROCEDURE — 3078F DIAST BP <80 MM HG: CPT | Mod: CPTII,,,

## 2024-04-18 PROCEDURE — 90833 PSYTX W PT W E/M 30 MIN: CPT | Mod: ,,,

## 2024-04-18 PROCEDURE — 99213 OFFICE O/P EST LOW 20 MIN: CPT | Mod: PBBFAC,PO

## 2024-04-18 PROCEDURE — 99999 PR PBB SHADOW E&M-EST. PATIENT-LVL III: CPT | Mod: PBBFAC,,,

## 2024-04-18 PROCEDURE — 99214 OFFICE O/P EST MOD 30 MIN: CPT | Mod: S$PBB,,,

## 2024-04-18 RX ORDER — LAMOTRIGINE 25 MG/1
TABLET ORAL
Qty: 50 TABLET | Refills: 0 | Status: SHIPPED | OUTPATIENT
Start: 2024-04-18 | End: 2024-05-27

## 2024-04-18 RX ORDER — BUPROPION HYDROCHLORIDE 150 MG/1
150 TABLET ORAL DAILY
Qty: 90 TABLET | Refills: 0 | Status: SHIPPED | OUTPATIENT
Start: 2024-04-18 | End: 2024-06-04

## 2024-04-18 NOTE — PROGRESS NOTES
"OUTPATIENT PSYCHIATRY FOLLOW UP VISIT    Encounter Date: 4/18/2024    Clinical Status of Patient:  Outpatient (Ambulatory)    Chief Complaint:  Mirella Kline is a 28 y.o. female who presents today for follow-up.  Met with patient.      HISTORY OF PRESENTING ILLNESS:  Mirella Kline is a 28 y.o. female with history of MDD, TAZ, BPD, insomnia who presents for follow up appointment.      1/17/2023: Pt is a 27 y.o. female with history of MDD, TAZ, borderline personality disorder, and insomnia who presents for follow up treatment. Pt established care with me on 11/4/21. Pt is currently taking lamotrigine 200 mg p.o. daily and Wellbutrin  mg po q.a.m. Pt has had previous trials of Prozac (ineffective), Lamictal (effective), Wellbutrin XL (effective), hydroxyzine, melatonin, and trazodone (AM drowsiness).     Today, patient states, My mood is good. Everything is good except for sleep." She notes continued difficulty initiating and maintaining sleep and states she can not sleep longer than 2 hours at a time.  She reports difficulty returning to sleep after awakening.  She experiences difficulty sleeping even when practicing good sleep hygiene and not taking naps during the day.  Trazodone caused morning grogginess.     Patient reports she is unable to attend [facility] DBT group due to conflicts with her school schedule.  She has found a DBT therapy program at [facility] which is an 8 week program.  She will be speaking to  about enrollment later today.     Patient reports her son recently broke his leg in an accident on a playground; this is causing some increase in stress.  Patient also reports she will be graduating college next semester.    Plan at last appointment on 1/17/2023:  Continue Lamictal 200 mg p.o. daily for mood  Continue Wellbutrin  mg po q.a.m. for anxiety and mood  Start doxepin 10 mg p.o. q.h.s. p.r.n. insomnia    Psychotropic medication " "history:   Prozac (ineffective), Lamictal (effective), Wellbutrin XL (effective), hydroxyzine, melatonin, and trazodone (AM drowsiness)       INTERVAL HISTORY:    Patient was last seen more than 1 year ago on 1/17/2023.  She reports she discontinued bupropion and lamotrigine after our last visit.  She reports sadness and crying spells 3 weeks ago, "I know it will come back." Reports this is not triggered.   Also reports significant anxiety, "I worry about things that normal people don't worry about. For 2 months I was completely distraught thinking about death. Or worried about my future." Also states she has been struggling with Anabaptism lately. "What is the purpose of life?" Denies MONSTER.  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  States she has spent most of her time at home and does not frequently go out.  She has not socialized on a regular basis.  "I feel like I have no friends because I don't go out anymore like I used to."  She states she would like to restart bupropion and lamotrigine.  She reports she has been vaping nicotine products and heard that bupropion help her to quit this.  Is starting a master's program in social work in May.    No interval episodes with symptoms consistent with shan or hypomania.  Denied interval or current suicidal/homicidal thoughts, intent, or plan or NSSI.  Denied other questions and concerns.    Medication side effects: None  Medication adherence: no    PSYCHIATRIC REVIEW OF SYSTEMS:  Is patient experiencing or having changes in:  Trouble with sleep:  difficulty sleeping, bed at 0200 with difficulty falling asleep, up around 2179-6997;   Appetite changes:  no  Weight changes:  no  Lack of energy:  fatigued   Anhedonia:  yes  Somatic symptoms:  no  Anxiety/panic:  worry about specific things  Irritability: irritability at baseline  Guilty/hopeless:  reports feelings of guilt, "about being a good " "mom"  Concentration: is able to concentrate but procrastinates  Racing thoughts: no  Impulsive behaviors: no  Paranoia/AVH: no  Self-injurious behavior/risky behavior:  no  Any drugs:  no  Alcohol:  no    Psychotherapy:  Target symptoms: recurrent depression, anxiety   Why chosen therapy is appropriate versus another modality: evidence based practice  Outcome monitoring methods: self-report, observation  Therapeutic intervention type: supportive psychotherapy  Topics discussed/themes: building skills sets for symptom management, symptom recognition  The patient's response to the intervention is accepting. The patient's progress toward treatment goals is fair.   Duration of intervention: 16 minutes.    MEDICAL REVIEW OF SYSTEMS:   Pain: Denies any significant chronic or acute pain.  Constitutional: Denies fever or change in appetite.  Cardiovascular: Denies chest pain or exertional dyspnea.  Respiratory: Denies cough or orthopnea.   GI: Denies abdominal pain, N/V  Neurological: Denies tremor, seizure, or focal weakness.  Psychiatric: See HPI above.    PAST PSYCHIATRIC, MEDICAL, AND SOCIAL HISTORY REVIEWED  The patient's past medical, family and social history have been reviewed and updated as appropriate within the electronic medical record - see encounter notes.    PAST MEDICAL HISTORY:   Past Medical History:   Diagnosis Date    History of psychiatric care     History of psychiatric hospitalization     Self-harming behavior     Suicide attempt     Head trauma/Loss of consciousness: denies  Seizures: denies     PAST PSYCHIATRIC HISTORY:  Past Psych Hx: anxiety and mood disorder  Dr Jimenez in 2013  First psych contact: inpatient psych admission at [facility] in 2013 after attempted suicide by unknown means, possible overdose     Prior hospitalizations: 2-3 times at [facility] for suicide attempts; she reports she has difficulty remembering this part of her life and cannot remember the method of " "theses attempts "maybe pills"  Prior suicide attempts or self-harm: 2-3, see above; denies cutting  Prior diagnosis: bipolar disorder, social phobia  Prior psychotherapy: 1 x month with  for last year     FAMILY HISTORY:   Paternal: father hx of ADHD; several family members with alcohol use disorder   Maternal: mother - undiagnosed but anxious, irritable  Siblings: 1 sister with similar symptoms of anxiety    SOCIAL HISTORY:   Childhood: Born in [place], raised in [place]  Marital Status: never   Children: 1 year old sonDevante  Resides: [place]  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[employer]; student  Hobbies: none currently, she reports she is trying to read again, however between her 1 year old and school, she does not have much time for herself  Mormonism:  denies  Education level: some college, currently attending [employer] majoring in social work, expected graduation date 2023  : denies   Legal: denies  Access to guns: denies    SUBSTANCE USE HISTORY:  Caffeine: Coffee 3-4 cups per coffee/day if she is at work or school  Tobacco:  former smoker, quit in Jan 2020  Alcohol: occasional, approximately once every few months, 2-3 drinks per occasion  Drug use: no  Rehab: no  Prior/current AA: no    MEDICATIONS:    Current Outpatient Medications:     drospirenone-ethinyl estradioL (LORYNA, 28,) 3-0.02 mg per tablet, Take 1 tablet by mouth once daily., Disp: 90 tablet, Rfl: 3    buPROPion (WELLBUTRIN XL) 150 MG TB24 tablet, Take 1 tablet (150 mg total) by mouth once daily., Disp: 90 tablet, Rfl: 0    lamoTRIgine (LAMICTAL) 25 MG tablet, Take 1 tablet (25 mg total) by mouth once daily for 14 days, THEN 2 tablets (50 mg total) once daily for 14 days, THEN 4 tablets (100 mg total) once daily for 2 days., Disp: 50 tablet, Rfl: 0    ALLERGIES:  Review of patient's allergies indicates:  No Known Allergies    EXAM:  Constitutional  Vitals:  Most recent vital signs were reviewed.   Last 3 sets " "of VS:      6/19/2023     1:15 PM 3/28/2024    11:06 AM 4/18/2024    10:56 AM   Vitals - 1 value per visit   SYSTOLIC 125 100 91   DIASTOLIC 79 74 70   Pulse 90  93   Temp 98.2 °F (36.8 °C)     SPO2 97 %     Weight (lb) 152 157.63 158.07   Weight (kg) 68.947 71.5 71.7   Height 5' 4" (1.626 m)  5' 4" (1.626 m)   BMI (Calculated) 26.1  27.1   Pain Score Zero  Zero      General:  unremarkable, age appropriate     Musculoskeletal  Muscle Strength/Tone:  No tremor or abnormal movements   Gait & Station:  Steady, non-ataxic     Psychiatric  Speech:  no latency; no press   Mood & Affect:  sad  congruent and appropriate   Thought Process:  normal and logical   Associations:  intact   Thought Content:  normal, no suicidality, no homicidality, delusions, or paranoia   Insight:  intact   Judgement: behavior is adequate to circumstances   Orientation:  grossly intact   Memory: intact for content of interview   Language: grossly intact   Attention Span & Concentration:  Intact to interview   Fund of Knowledge:  Not tested     SUICIDE RISK ASSESSMENT:  Protective factors: age, gender, no ongoing substance abuse, no psychosis, has a son, denies active SI/intent/plan, seeking treatment, access to treatment, future oriented, good primary support, no access to firearms  Risks: 2-3 prior attempts, 2-3 prior hospitalizations, ongoing depression and anxiety  Patient is a low immediate and long-term risk considering risk factors.     RELEVANT LABS/STUDIES:    Lab Results   Component Value Date    WBC 12.29 10/06/2020    HGB 10.1 (L) 10/06/2020    HCT 30.7 (L) 10/06/2020    MCV 85 10/06/2020     (L) 10/06/2020     BMP  Lab Results   Component Value Date     06/08/2017    K 4.1 06/08/2017     06/08/2017    CO2 27 06/08/2017    BUN 11 06/08/2017    CREATININE 0.65 06/08/2017    CALCIUM 9.0 06/08/2017    ANIONGAP 11 06/08/2017    ESTGFRAFRICA >60 06/08/2017    EGFRNONAA >60 06/08/2017     Lab Results   Component Value Date " "   ALT 11 09/17/2013    AST 14 09/17/2013    ALKPHOS 85 09/17/2013    BILITOT 0.7 09/17/2013     Lab Results   Component Value Date    TSH 1.970 06/08/2017     No results found for: "LABA1C", "HGBA1C"  Lab Results   Component Value Date    CHOL 203 (H) 09/17/2013    TRIG 121 09/17/2013    HDL 49 09/17/2013    LDLCALC 129.8 09/17/2013    CHOLHDL 24.1 09/17/2013    TOTALCHOLEST 4.1 09/17/2013       IMPRESSION:    Mirella Kline is a 28 y.o. female with history of MDD, TAZ, BPD, insomnia  who presents for follow up appointment.    Status/Progress: Based on the examination today, the patient's problem(s) is/are inadequately controlled.  New problems have not been presented today.   Co-morbidities are not complicating management of the primary condition.  There are no active rule-out diagnoses for this patient at this time.     Risk Parameters:  Patient reports no suicidal ideation  Patient reports no homicidal ideation  Patient reports no self-injurious behavior  Patient reports no violent behavior    DIAGNOSES:    ICD-10-CM ICD-9-CM   1. Mild episode of recurrent major depressive disorder  F33.0 296.31   2. TAZ (generalized anxiety disorder)  F41.1 300.02   3. Borderline personality disorder  F60.3 301.83   4. Insomnia, unspecified type  G47.00 780.52       PLAN:  Start lamotrigine 25 mg daily for 2 weeks, then increase as tolerated to 50 mg daily for 2 weeks, then increase to 100 mg daily for 2 weeks, then increase to 200 mg daily for mood  Start bupropion  mg q.a.m. for anxiety and mood  Start doxepin 10 mg q.h.s. p.r.n. insomnia  Offered referral for individual psychotherapy.    RETURN TO CLINIC:   1 month      EVI Turner, PMHNP-BC      50 minutes of total time spent on the encounter, which includes face to face time and non-face to face time preparing to see the patient (eg. review of tests), obtaining and/or reviewing separately obtained history, documenting clinical information in the " electronic health record, independently interpreting results (not separately reported), and communicating results to the patient/family/caregiver, or care coordination (not separately reported).     Visit today included managing the longitudinal care of the patient due to the serious and/or complex managed problem(s) MDD, TAZ, BPD, insomnia.    At this time there are no indications the patient represents an imminent danger to either themselves or others; will continue to manage treatment in the outpatient setting.    I discussed the patient's care with the patient including benefits, alternatives, possible adverse effects of the treatment plan; including the potential for metabolic complications, major organ dysfunction, black box warnings, and contraindications. The opportunity was given for questions/clarification, and after this discussion the above treatment plan was devised through shared decision making. The patient voiced their understanding of the diagnoses and treatments listed above and agreed to the treatment plan. Follow up plan was reviewed with the patient. The patient was advised to call to report any worsening of symptoms or problems with medication.    Supportive therapy and psychoeducation provided. I discussed the importance of regular exercise, maintenance of a healthy weight, balanced diet rich in fruits/vegetables and lean protein, and avoidance of unhealthy habits like smoking and excessive alcohol intake.     Patient has been given crisis information including Suicide and Crisis Lifeline (call or text: 836). Patient also given instructions to go to the nearest ER or call 911 if unable to remain safe or if the Pt develops thoughts of harming self or others.    Documentation entered by me for this encounter may have been done in part using [employer] voice recognition transcription software. Garbled syntax, mangled pronouns, and other bizarre constructions may be attributed to that  "software system. Although I have made an effort to ensure accuracy, "sound like" errors may exist and should be interpreted in context.  "

## 2024-04-25 ENCOUNTER — ON-DEMAND VIRTUAL (OUTPATIENT)
Dept: URGENT CARE | Facility: CLINIC | Age: 29
End: 2024-04-25
Payer: MEDICAID

## 2024-04-25 DIAGNOSIS — K13.79 MOUTH SORES: Primary | ICD-10-CM

## 2024-04-25 PROCEDURE — 99202 OFFICE O/P NEW SF 15 MIN: CPT | Mod: 95,,, | Performed by: NURSE PRACTITIONER

## 2024-04-25 NOTE — PROGRESS NOTES
Subjective:      Patient ID: Mirella Kline is a 28 y.o. female.    Vitals:  vitals were not taken for this visit.     Chief Complaint: Mouth Lesions      Visit Type: TELE AUDIOVISUAL    Present with the patient at the time of consultation: TELEMED PRESENT WITH PATIENT: family member    Location: Home in [place], [place]     Past Medical History:   Diagnosis Date    History of psychiatric care     History of psychiatric hospitalization     Self-harming behavior     Suicide attempt      Past Surgical History:   Procedure Laterality Date    ADENOIDECTOMY      TONSILLECTOMY       Review of patient's allergies indicates:  No Known Allergies  Current Outpatient Medications on File Prior to Visit   Medication Sig Dispense Refill    buPROPion (WELLBUTRIN XL) 150 MG TB24 tablet Take 1 tablet (150 mg total) by mouth once daily. 90 tablet 0    drospirenone-ethinyl estradioL (LORYNA, 28,) 3-0.02 mg per tablet Take 1 tablet by mouth once daily. 90 tablet 3    lamoTRIgine (LAMICTAL) 25 MG tablet Take 1 tablet (25 mg total) by mouth once daily for 14 days, THEN 2 tablets (50 mg total) once daily for 14 days, THEN 4 tablets (100 mg total) once daily for 2 days. 50 tablet 0     No current facility-administered medications on file prior to visit.     Family History   Problem Relation Name Age of Onset    Diabetes Paternal Grandmother      Bipolar disorder Neg Hx      Schizophrenia Neg Hx      Breast cancer Neg Hx      Ovarian cancer Neg Hx      Uterine cancer Neg Hx      Colon cancer Neg Hx             Ohs Peq Odvv Intake    4/25/2024  3:08 PM CDT - Filed by Patient   What is your current physical address in the event of a medical emergency? [address] [place] [address]   Are you able to take your vital signs? No   Please attach any relevant images or files          Pt reports she's asked dentist about ongoing canker sores in mouth and dentist states it's not gum/tooth related. Has been coming and going for the past  year. Does not take any meds -- flosses, does mouthwash and uses gum oil occasionally. Nothing really helps.     Mouth Lesions   The current episode started more than 1 week ago. The onset was sudden. The problem occurs frequently. The symptoms are relieved by one or more OTC medications. Nothing aggravates the symptoms. Associated symptoms include mouth sores. Pertinent negatives include no fever, no sore throat and no rash.       Constitution: Negative for fever.   HENT:  Positive for mouth sores. Negative for sore throat.    Skin:  Negative for rash.        Objective:   The physical exam was conducted virtually.  Physical Exam   Constitutional: She is oriented to person, place, and time. No distress.   HENT:   Head: Normocephalic.   Mouth/Throat: Oropharynx is clear and moist and mucous membranes are normal. Oral lesions (on lower front gums on the right side) present.   Eyes: Conjunctivae are normal. No scleral icterus.   Pulmonary/Chest: Effort normal. No respiratory distress.   Musculoskeletal: Normal range of motion.         General: Normal range of motion.   Neurological: She is alert and oriented to person, place, and time.   Skin: Skin is not diaphoretic.   Psychiatric: Her behavior is normal. Judgment and thought content normal.       Assessment:     1. Mouth sores        Plan:       Mouth sores        Rest. Increase fluids to stay well hydrated.    Consider a multivitamin, as you may be deficient.    Apply ice or a cold, wet towel on the sores a few times each day. This will help with redness and swelling.    OTC Tylenol or Ibuprofen as needed.     Warm salt water gargles several times a day.     Avoid kissing anyone. Avoid sharing personal items like razors, towels, makeup, cups, or eating utensils. Do not share a toothbrush. Get a new toothbrush when the cold sores are gone.    Follow-up with your PCP in 3 days.    Go to the nearest urgent care immediately for symptoms including but not limited to:  redness, swelling, increased warmth to palpation, discharge/drainage that is pus-looking or foul-smelling, fever, worsening pain, etc.     All questions were answered to the best of my abilities and all concerns were addressed at this time.     Follow up:   1. Please schedule a virtual follow up visit as needed.  2. Please see an in-person provider if your symptoms are worsening or not improving in 2-3 days.  3. Please print a copy of this note and send it to your regular doctor or take it to your next visit so it may be included in your medical record.   4. You must understand that you've received Telehealth Urgent Care treatment only and that you may be released before all your medical problems are known or treated. You, the patient, will arrange for follow up care as instructed.  5. Follow up with your PCP or specialty clinic as directed. To schedule an appointment with the appropriate provider with [facility], please call [phone]. For pediatric referrals, please call [phone]  6. Contact customer support at [phone] for questions or concerns  7. For prescription questions or problems, call [phone] anytime for assistance.  8. For [facility] support, call [phone].

## 2024-04-26 ENCOUNTER — LAB VISIT (OUTPATIENT)
Dept: LAB | Facility: HOSPITAL | Age: 29
End: 2024-04-26
Payer: MEDICAID

## 2024-04-26 ENCOUNTER — OFFICE VISIT (OUTPATIENT)
Dept: OBSTETRICS AND GYNECOLOGY | Facility: CLINIC | Age: 29
End: 2024-04-26
Payer: MEDICAID

## 2024-04-26 VITALS
SYSTOLIC BLOOD PRESSURE: 110 MMHG | DIASTOLIC BLOOD PRESSURE: 84 MMHG | WEIGHT: 156.75 LBS | BODY MASS INDEX: 26.76 KG/M2 | HEIGHT: 64 IN

## 2024-04-26 DIAGNOSIS — Z11.3 SCREENING EXAMINATION FOR STD (SEXUALLY TRANSMITTED DISEASE): ICD-10-CM

## 2024-04-26 DIAGNOSIS — Z11.3 SCREENING EXAMINATION FOR STD (SEXUALLY TRANSMITTED DISEASE): Primary | ICD-10-CM

## 2024-04-26 PROBLEM — Z34.90 ENCOUNTER FOR INDUCTION OF LABOR: Status: RESOLVED | Noted: 2020-10-05 | Resolved: 2024-04-26

## 2024-04-26 PROBLEM — Z34.90 TERM PREGNANCY: Status: RESOLVED | Noted: 2020-10-05 | Resolved: 2024-04-26

## 2024-04-26 PROCEDURE — 36415 COLL VENOUS BLD VENIPUNCTURE: CPT | Mod: PN

## 2024-04-26 PROCEDURE — 3079F DIAST BP 80-89 MM HG: CPT | Mod: CPTII,,,

## 2024-04-26 PROCEDURE — 80074 ACUTE HEPATITIS PANEL: CPT

## 2024-04-26 PROCEDURE — 87389 HIV-1 AG W/HIV-1&-2 AB AG IA: CPT

## 2024-04-26 PROCEDURE — 3008F BODY MASS INDEX DOCD: CPT | Mod: CPTII,,,

## 2024-04-26 PROCEDURE — 1159F MED LIST DOCD IN RCRD: CPT | Mod: CPTII,,,

## 2024-04-26 PROCEDURE — 86593 SYPHILIS TEST NON-TREP QUANT: CPT

## 2024-04-26 PROCEDURE — 3074F SYST BP LT 130 MM HG: CPT | Mod: CPTII,,,

## 2024-04-26 PROCEDURE — 99213 OFFICE O/P EST LOW 20 MIN: CPT | Mod: PBBFAC,PN

## 2024-04-26 PROCEDURE — 99213 OFFICE O/P EST LOW 20 MIN: CPT | Mod: S$PBB,,,

## 2024-04-26 PROCEDURE — 87491 CHLMYD TRACH DNA AMP PROBE: CPT

## 2024-04-26 PROCEDURE — 1160F RVW MEDS BY RX/DR IN RCRD: CPT | Mod: CPTII,,,

## 2024-04-26 PROCEDURE — 87661 TRICHOMONAS VAGINALIS AMPLIF: CPT

## 2024-04-26 PROCEDURE — 99999 PR PBB SHADOW E&M-EST. PATIENT-LVL III: CPT | Mod: PBBFAC,,,

## 2024-04-26 NOTE — PROGRESS NOTES
Chief Complaint: STD testing      HPI:   Pt is a 28 y.o. here today desiring STD testing. One partner in the last 12 months, does not use condoms. COCP for contraception.  Has concerns of possible STDs. Denies vaginal discharge/pain/f/c/n/v. Partner with no reported symptoms. This is the extent of the patient's complaints at this time.     ROS:  GENERAL: No fever, chills, fatigability or weight loss.  VULVAR: No pain, no lesions and no itching.  VAGINAL: No relaxation, no itching, no discharge, no abnormal bleeding and no lesions.  URINARY: No incontinence, no nocturia, no frequency and no dysuria.     PHYSICAL EXAM:  Physical Exam  Deferred, asymptomatic     Past Medical History:   Diagnosis Date    History of psychiatric care     History of psychiatric hospitalization     Self-harming behavior     Suicide attempt        Past Surgical History:   Procedure Laterality Date    ADENOIDECTOMY      TONSILLECTOMY         Family History   Problem Relation Name Age of Onset    Diabetes Paternal Grandmother      Bipolar disorder Neg Hx      Schizophrenia Neg Hx      Breast cancer Neg Hx      Ovarian cancer Neg Hx      Uterine cancer Neg Hx      Colon cancer Neg Hx         Social History     Socioeconomic History    Marital status: Single   Tobacco Use    Smoking status: Every Day     Types: Vaping with nicotine    Smokeless tobacco: Never   Substance and Sexual Activity    Alcohol use: Yes     Comment: social     Drug use: Never    Sexual activity: Yes     Partners: Male     Birth control/protection: OCP   Other Topics Concern    Childhood History: Raised by parents Yes    Education: High School Graduate Yes     Social Determinants of Health     Financial Resource Strain: Medium Risk (4/18/2024)    Overall Financial Resource Strain (CARDIA)     Difficulty of Paying Living Expenses: Somewhat hard   Food Insecurity: Unknown (4/18/2024)    Hunger Vital Sign     Worried About Running Out of Food in the Last Year: Never true      Ran Out of Food in the Last Year: Patient declined   Transportation Needs: No Transportation Needs (2024)    PRAPARE - Transportation     Lack of Transportation (Medical): No     Lack of Transportation (Non-Medical): No   Physical Activity: Insufficiently Active (2024)    Exercise Vital Sign     Days of Exercise per Week: 2 days     Minutes of Exercise per Session: 30 min   Stress: Stress Concern Present (2024)    [employer] [place] - Occupational Stress Questionnaire     Feeling of Stress : Rather much   Social Connections: Unknown (2024)    Social Connection and Isolation Panel [NHANES]     Frequency of Communication with Friends and Family: Three times a week     Frequency of Social Gatherings with Friends and Family: Patient declined     Active Member of Clubs or Organizations: No     Attends Club or Organization Meetings: Never     Marital Status: Patient declined   Housing Stability: Unknown (2024)    Housing Stability Vital Sign     Unable to Pay for Housing in the Last Year: No       Current Outpatient Medications   Medication Sig Dispense Refill    buPROPion (WELLBUTRIN XL) 150 MG TB24 tablet Take 1 tablet (150 mg total) by mouth once daily. 90 tablet 0    drospirenone-ethinyl estradioL (LORYNA, 28,) 3-0.02 mg per tablet Take 1 tablet by mouth once daily. 90 tablet 3    lamoTRIgine (LAMICTAL) 25 MG tablet Take 1 tablet (25 mg total) by mouth once daily for 14 days, THEN 2 tablets (50 mg total) once daily for 14 days, THEN 4 tablets (100 mg total) once daily for 2 days. 50 tablet 0     No current facility-administered medications for this visit.       Review of patient's allergies indicates:  No Known Allergies       OB History    Para Term  AB Living   1 1 1 0 0 1   SAB IAB Ectopic Multiple Live Births   0 0 0 0 1      # Outcome Date GA Lbr Kunal/2nd Weight Sex Type Anes PTL Lv   1 Term 10/05/20 40w2d / 00:30 3.345 kg (7 lb 6 oz) M Vag-Spont EPI N  TAI          Assessment/Plan:    Screening examination for STD (sexually transmitted disease)  -     Treponema Pallidium Antibodies IgG, IgM; Future; Expected date: 04/26/2024  -     HIV 1/2 Ag/Ab (4th Gen); Future; Expected date: 04/26/2024  -     HEPATITIS PANEL, ACUTE; Future; Expected date: 04/26/2024  -     C. trachomatis/N. gonorrhoeae by AMP DNA [facility]; Urine  -     Trichomonas vaginalis, RNA, Qual, Urine        Will call with results. Recommended Condom use 100% to prevent STDs    RTC prn and for annual

## 2024-04-27 LAB
HAV IGM SERPL QL IA: NORMAL
HBV CORE IGM SERPL QL IA: NORMAL
HBV SURFACE AG SERPL QL IA: NORMAL
HCV AB SERPL QL IA: NORMAL
HIV 1+2 AB+HIV1 P24 AG SERPL QL IA: NORMAL
TREPONEMA PALLIDUM IGG+IGM AB [PRESENCE] IN SERUM OR PLASMA BY IMMUNOASSAY: NONREACTIVE

## 2024-04-28 LAB
C TRACH DNA SPEC QL NAA+PROBE: NOT DETECTED
N GONORRHOEA DNA SPEC QL NAA+PROBE: NOT DETECTED

## 2024-05-01 LAB
SPECIMEN SOURCE: NORMAL
T VAGINALIS RRNA SPEC QL NAA+PROBE: NEGATIVE

## 2024-05-23 ENCOUNTER — PATIENT MESSAGE (OUTPATIENT)
Dept: PSYCHIATRY | Facility: CLINIC | Age: 29
End: 2024-05-23
Payer: MEDICAID

## 2024-05-27 ENCOUNTER — OFFICE VISIT (OUTPATIENT)
Dept: PSYCHIATRY | Facility: CLINIC | Age: 29
End: 2024-05-27
Payer: MEDICAID

## 2024-05-27 DIAGNOSIS — F33.0 MILD EPISODE OF RECURRENT MAJOR DEPRESSIVE DISORDER: Primary | ICD-10-CM

## 2024-05-27 DIAGNOSIS — G47.00 INSOMNIA, UNSPECIFIED TYPE: ICD-10-CM

## 2024-05-27 DIAGNOSIS — F41.1 GAD (GENERALIZED ANXIETY DISORDER): ICD-10-CM

## 2024-05-27 DIAGNOSIS — F60.3 BORDERLINE PERSONALITY DISORDER: ICD-10-CM

## 2024-05-27 PROCEDURE — 1160F RVW MEDS BY RX/DR IN RCRD: CPT | Mod: CPTII,95,,

## 2024-05-27 PROCEDURE — 99215 OFFICE O/P EST HI 40 MIN: CPT | Mod: 95,,,

## 2024-05-27 PROCEDURE — 1159F MED LIST DOCD IN RCRD: CPT | Mod: CPTII,95,,

## 2024-05-27 PROCEDURE — G2211 COMPLEX E/M VISIT ADD ON: HCPCS | Mod: 95,,,

## 2024-05-27 RX ORDER — ESCITALOPRAM OXALATE 10 MG/1
TABLET ORAL
Qty: 87 TABLET | Refills: 0 | Status: SHIPPED | OUTPATIENT
Start: 2024-05-27 | End: 2024-08-25

## 2024-05-27 NOTE — PROGRESS NOTES
"OUTPATIENT PSYCHIATRY FOLLOW UP VISIT    Encounter Date: 5/27/2024    Clinical Status of Patient:  Outpatient (Virtual)  The patient location is: [address] [place] [address]  The patient phone number is: [phone]   Visit type: Virtual visit with synchronous audio and video  Each patient to whom he or she provides medical services by telemedicine is:  (1) informed of the relationship between the practitioner and patient and the respective role of any other health care provider with respect to management of the patient; and (2) notified that he or she may decline to receive medical services by telemedicine and may withdraw from such care at any time.    Chief Complaint:  Mirella Kline is a 28 y.o. female who presents today for follow-up.  Met with patient.      HISTORY OF PRESENTING ILLNESS:  Mirella Kline is a 28 y.o. female with history of MDD, TAZ, BPD, insomnia who presents for follow up appointment.      4/18/2024: Patient was last seen more than 1 year ago on 1/17/2023.  She reports she discontinued bupropion and lamotrigine after our last visit.  She reports sadness and crying spells 3 weeks ago, "I know it will come back." Reports this is not triggered.   Also reports significant anxiety, "I worry about things that normal people don't worry about. For 2 months I was completely distraught thinking about death. Or worried about my future." Also states she has been struggling with Taoist lately. "What is the purpose of life?" Denies SI.  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  States she has spent most of her time at home and does not frequently go out.  She has not socialized on a regular basis.  "I feel like I have no friends because I don't go out anymore like I used to."  She states she would like to restart bupropion and lamotrigine.  She reports she has been vaping nicotine products and heard that bupropion help " "her to quit this.  Is starting a master's program in social work in May.    Plan at last appointment:  Start lamotrigine 25 mg daily for 2 weeks, then increase as tolerated to 50 mg daily for 2 weeks, then increase to 100 mg daily for 2 weeks, then increase to 200 mg daily for mood  Start bupropion  mg q.a.m. for anxiety and mood  Start doxepin 10 mg q.h.s. p.r.n. insomnia  Offered referral for individual psychotherapy.    Psychotropic medication history:   Prozac (ineffective), Lamictal (effective), Wellbutrin XL (effective), hydroxyzine, melatonin, and trazodone (AM drowsiness)       INTERVAL HISTORY:    Typical titration of lamotrigine was restarted at last visit for mood.  Today, patient reports that upon increasing lamotrigine to 50 mg daily she experienced irritability and anger as well as suicidal ideation so she discontinued lamotrigine.  She denies suicidal plan or intent and notes suicidal ideation has resolved after discontinuing lamotrigine.  She notes that previously she believed SSRIs caused increased irritability and anxiety in the past but she now believes it was the lamotrigine that was causing the irritability.  She does report continued irritability at baseline.    Bupropion  mg q.a.m. was started at last visit for mood.  Patient denies difficulty restarting bupropion and has continued bupropion.    She did not start doxepin for insomnia as sleep has improved.    Mood has improved. "I'm fine. I get occasional mood swings. But I'm ok right now."     Worry and anxiety have resolved.    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 Summer semester is 5 weeks long, has to read 300 pages by the end of the week. Notes this is causing some stress, "But it's fine. Everything is fine."    No interval episodes with symptoms consistent with shan or hypomania.  Denied interval or current suicidal/homicidal thoughts, intent, or plan or NSSI.  Denied other questions and concerns.    Medication side " "effects: None  Medication adherence: no    PSYCHIATRIC REVIEW OF SYSTEMS:  Is patient experiencing or having changes in:  Trouble with sleep:  sleeping well, getting 7-8 hours of sleep per night  Appetite changes:  no  Weight changes:  no  Lack of energy:  "It's ok, it hasn't been bad"   Anhedonia:  yes  Somatic symptoms:  no  Anxiety/panic:  resolved  Irritability: irritability worsened with lamotrigine but has decreased after discontinuation of lamotrigine; continued irritability at baseline  Guilty/hopeless:  reports feelings of guilt, "about being a good mom"  Concentration: is able to concentrate but procrastinates  Racing thoughts: no  Impulsive behaviors: no  Paranoia/AVH: no  Self-injurious behavior/risky behavior:  no  Any drugs:  no  Alcohol:  no    Psychotherapy:  Target symptoms: recurrent depression, anxiety   Why chosen therapy is appropriate versus another modality: evidence based practice  Outcome monitoring methods: self-report, observation  Therapeutic intervention type: supportive psychotherapy  Topics discussed/themes: building skills sets for symptom management, symptom recognition  The patient's response to the intervention is accepting. The patient's progress toward treatment goals is fair.   Duration of intervention: 16 minutes.    MEDICAL REVIEW OF SYSTEMS:   Pain: Denies any significant chronic or acute pain.  Constitutional: Denies fever or change in appetite.  Cardiovascular: Denies chest pain or exertional dyspnea.  Respiratory: Denies cough or orthopnea.   GI: Denies abdominal pain, N/V  Neurological: Denies tremor, seizure, or focal weakness.  Psychiatric: See HPI above.    PAST PSYCHIATRIC, MEDICAL, AND SOCIAL HISTORY REVIEWED  The patient's past medical, family and social history have been reviewed and updated as appropriate within the electronic medical record - see encounter notes.    PAST MEDICAL HISTORY:   Past Medical History:   Diagnosis Date    History of psychiatric care     " "History of psychiatric hospitalization     Self-harming behavior     Suicide attempt     Head trauma/Loss of consciousness: denies  Seizures: denies     PAST PSYCHIATRIC HISTORY:  Past Psych Hx: anxiety and mood disorder  Dr Jimenez in 2013  First psych contact: inpatient psych admission at [facility] in 2013 after attempted suicide by unknown means, possible overdose     Prior hospitalizations: 2-3 times at [facility] for suicide attempts; she reports she has difficulty remembering this part of her life and cannot remember the method of theses attempts "maybe pills"  Prior suicide attempts or self-harm: 2-3, see above; denies cutting  Prior diagnosis: bipolar disorder, social phobia  Prior psychotherapy: 1 x month with  for last year     FAMILY HISTORY:   Paternal: father hx of ADHD; several family members with alcohol use disorder   Maternal: mother - undiagnosed but anxious, irritable  Siblings: 1 sister with similar symptoms of anxiety    SOCIAL HISTORY:   Childhood: Born in [place], raised in [place]  Marital Status: never   Children: 1 year old sonDevante  Resides: [place]  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[employer]; student  Hobbies: none currently, she reports she is trying to read again, however between her 1 year old and school, she does not have much time for herself  Alevism:  denies  Education level: some college, currently attending [employer] majoring in social work, expected graduation date 2023  : denies   Legal: denies  Access to guns: denies    SUBSTANCE USE HISTORY:  Caffeine: Coffee 3-4 cups per coffee/day if she is at work or school  Tobacco:  former smoker, quit in Jan 2020  Alcohol: occasional, approximately once every few months, 2-3 drinks per occasion  Drug use: no  Rehab: no  Prior/current AA: no    MEDICATIONS:    Current Outpatient Medications:     buPROPion (WELLBUTRIN XL) 150 MG TB24 tablet, Take 1 tablet (150 mg total) by mouth " "once daily., Disp: 90 tablet, Rfl: 0    drospirenone-ethinyl estradioL (LORYNA, 28,) 3-0.02 mg per tablet, Take 1 tablet by mouth once daily., Disp: 90 tablet, Rfl: 3    EScitalopram oxalate (LEXAPRO) 10 MG tablet, Take 0.5 tablets (5 mg total) by mouth once daily for 7 days, THEN 1 tablet (10 mg total) once daily., Disp: 87 tablet, Rfl: 0    ALLERGIES:  Review of patient's allergies indicates:  No Known Allergies    EXAM:  Constitutional  Vitals:  Most recent vital signs were reviewed.   Last 3 sets of VS:      3/28/2024    11:06 AM 4/18/2024    10:56 AM 4/26/2024     1:53 PM   Vitals - 1 value per visit   SYSTOLIC 100 91 110   DIASTOLIC 74 70 84   Pulse  93    Weight (lb) 157.63 158.07 156.75   Weight (kg) 71.5 71.7 71.1   Height  5' 4" (1.626 m) 5' 4" (1.626 m)   BMI (Calculated)  27.1 26.9   Pain Score  Zero Zero      General:  unremarkable, age appropriate     Musculoskeletal  Muscle Strength/Tone:  No tremors appreciated   Gait & Station:  Unable to assess, Pt seated during virtual visit     Psychiatric  Speech:  no latency; no press   Mood & Affect:  euthymic  congruent and appropriate   Thought Process:  normal and logical   Associations:  intact   Thought Content:  normal, no suicidality, no homicidality, delusions, or paranoia   Insight:  intact   Judgement: behavior is adequate to circumstances   Orientation:  grossly intact   Memory: intact for content of interview   Language: grossly intact   Attention Span & Concentration:  Intact to interview   Fund of Knowledge:  Not tested     SUICIDE RISK ASSESSMENT:  Protective factors: age, gender, no ongoing substance abuse, no psychosis, has a son, denies active SI/intent/plan, seeking treatment, access to treatment, future oriented, good primary support, no access to firearms  Risks: 2-3 prior attempts, 2-3 prior hospitalizations, ongoing depression and anxiety  Patient is a low immediate and long-term risk considering risk factors.     RELEVANT LABS/STUDIES:  " "  Lab Results   Component Value Date    WBC 12.29 10/06/2020    HGB 10.1 (L) 10/06/2020    HCT 30.7 (L) 10/06/2020    MCV 85 10/06/2020     (L) 10/06/2020     BMP  Lab Results   Component Value Date     06/08/2017    K 4.1 06/08/2017     06/08/2017    CO2 27 06/08/2017    BUN 11 06/08/2017    CREATININE 0.65 06/08/2017    CALCIUM 9.0 06/08/2017    ANIONGAP 11 06/08/2017    ESTGFRAFRICA >60 06/08/2017    EGFRNONAA >60 06/08/2017     Lab Results   Component Value Date    ALT 11 09/17/2013    AST 14 09/17/2013    ALKPHOS 85 09/17/2013    BILITOT 0.7 09/17/2013     Lab Results   Component Value Date    TSH 1.970 06/08/2017     No results found for: "LABA1C", "HGBA1C"  Lab Results   Component Value Date    CHOL 203 (H) 09/17/2013    TRIG 121 09/17/2013    HDL 49 09/17/2013    LDLCALC 129.8 09/17/2013    CHOLHDL 24.1 09/17/2013    TOTALCHOLEST 4.1 09/17/2013       IMPRESSION:    Mirella Kline is a 28 y.o. female with history of MDD, TAZ, BPD, insomnia who presents for follow up appointment.    Status/Progress: Based on the examination today, the patient's problem(s) is/are adequately but not ideally controlled.  New problems have not been presented today.   Co-morbidities are not complicating management of the primary condition.  There are no active rule-out diagnoses for this patient at this time.     Risk Parameters:  Patient reports no suicidal ideation  Patient reports no homicidal ideation  Patient reports no self-injurious behavior  Patient reports no violent behavior    DIAGNOSES:    ICD-10-CM ICD-9-CM   1. Mild episode of recurrent major depressive disorder  F33.0 296.31   2. ATZ (generalized anxiety disorder)  F41.1 300.02   3. Borderline personality disorder  F60.3 301.83   4. Insomnia, unspecified type  G47.00 780.52       PLAN:  Patient auto discontinued lamotrigine due to anger, irritability, and SI  Continue bupropion  mg q.a.m. for anxiety and mood  Start escitalopram 5 mg " daily for 7 days then increase as tolerated to 10 mg daily for irritability  Did not start doxepin 10 mg q.h.s. p.r.n. insomnia  Offered referral for individual psychotherapy.    RETURN TO CLINIC:   6 weeks      EVI Turner, PMHNP-BC      50 minutes of total time spent on the encounter, which includes face to face time and non-face to face time preparing to see the patient (eg. review of tests), obtaining and/or reviewing separately obtained history, documenting clinical information in the electronic health record, independently interpreting results (not separately reported), and communicating results to the patient/family/caregiver, or care coordination (not separately reported).     Visit today included managing the longitudinal care of the patient due to the serious and/or complex managed problem(s) MDD, TAZ, BPD, insomnia.    At this time there are no indications the patient represents an imminent danger to either themselves or others; will continue to manage treatment in the outpatient setting.    I discussed the patient's care with the patient including benefits, alternatives, possible adverse effects of the treatment plan; including the potential for metabolic complications, major organ dysfunction, black box warnings, and contraindications. The opportunity was given for questions/clarification, and after this discussion the above treatment plan was devised through shared decision making. The patient voiced their understanding of the diagnoses and treatments listed above and agreed to the treatment plan. Follow up plan was reviewed with the patient. The patient was advised to call to report any worsening of symptoms or problems with medication.    Supportive therapy and psychoeducation provided. I discussed the importance of regular exercise, maintenance of a healthy weight, balanced diet rich in fruits/vegetables and lean protein, and avoidance of unhealthy habits like smoking and excessive alcohol  "intake.     Patient has been given crisis information including Suicide and Crisis Lifeline (call or text: 828). Patient also given instructions to go to the nearest ER or call 911 if unable to remain safe or if the Pt develops thoughts of harming self or others.    Documentation entered by me for this encounter may have been done in part using [employer] voice recognition transcription software. Garbled syntax, mangled pronouns, and other bizarre constructions may be attributed to that software system. Although I have made an effort to ensure accuracy, "sound like" errors may exist and should be interpreted in context.  "

## 2024-06-04 RX ORDER — BUPROPION HYDROCHLORIDE 150 MG/1
150 TABLET ORAL
Qty: 90 TABLET | Refills: 0 | Status: SHIPPED | OUTPATIENT
Start: 2024-06-04

## 2024-07-05 ENCOUNTER — PATIENT MESSAGE (OUTPATIENT)
Dept: PSYCHIATRY | Facility: CLINIC | Age: 29
End: 2024-07-05
Payer: MEDICAID

## 2024-07-08 ENCOUNTER — OFFICE VISIT (OUTPATIENT)
Dept: PSYCHIATRY | Facility: CLINIC | Age: 29
End: 2024-07-08
Payer: MEDICAID

## 2024-07-08 DIAGNOSIS — F41.1 GAD (GENERALIZED ANXIETY DISORDER): ICD-10-CM

## 2024-07-08 DIAGNOSIS — F33.40 RECURRENT MAJOR DEPRESSIVE DISORDER, IN REMISSION: Primary | ICD-10-CM

## 2024-07-08 DIAGNOSIS — F60.3 BORDERLINE PERSONALITY DISORDER: ICD-10-CM

## 2024-07-08 PROCEDURE — 1160F RVW MEDS BY RX/DR IN RCRD: CPT | Mod: CPTII,95,,

## 2024-07-08 PROCEDURE — 99215 OFFICE O/P EST HI 40 MIN: CPT | Mod: 95,,,

## 2024-07-08 PROCEDURE — G2211 COMPLEX E/M VISIT ADD ON: HCPCS | Mod: 95,,,

## 2024-07-08 PROCEDURE — 1159F MED LIST DOCD IN RCRD: CPT | Mod: CPTII,95,,

## 2024-07-08 RX ORDER — BUPROPION HYDROCHLORIDE 300 MG/1
300 TABLET ORAL DAILY
Qty: 90 TABLET | Refills: 0 | Status: SHIPPED | OUTPATIENT
Start: 2024-07-08 | End: 2025-07-08

## 2024-07-08 NOTE — PROGRESS NOTES
"OUTPATIENT PSYCHIATRY FOLLOW UP VISIT    Encounter Date: 7/8/2024    Clinical Status of Patient:  Outpatient (Virtual)  The patient location is: [address] [place] [address]  The patient phone number is: [phone]   Visit type: Virtual visit with synchronous audio and video  Each patient to whom he or she provides medical services by telemedicine is:  (1) informed of the relationship between the practitioner and patient and the respective role of any other health care provider with respect to management of the patient; and (2) notified that he or she may decline to receive medical services by telemedicine and may withdraw from such care at any time.    Chief Complaint:  Mirella Kline is a 28 y.o. female who presents today for follow-up.  Met with patient.      HISTORY OF PRESENTING ILLNESS:  Mirella Kline is a 28 y.o. female with history of MDD, TAZ, BPD, insomnia who presents for follow up appointment.      5/27/2024: Typical titration of lamotrigine was restarted at last visit for mood.  Today, patient reports that upon increasing lamotrigine to 50 mg daily she experienced irritability and anger as well as suicidal ideation so she discontinued lamotrigine.  She denies suicidal plan or intent and notes suicidal ideation has resolved after discontinuing lamotrigine.  She notes that previously she believed SSRIs caused increased irritability and anxiety in the past but she now believes it was the lamotrigine that was causing the irritability.  She does report continued irritability at baseline.  Bupropion  mg q.a.m. was started at last visit for mood.  Patient denies difficulty restarting bupropion and has continued bupropion.  She did not start doxepin for insomnia as sleep has improved.  Mood has improved. "I'm fine. I get occasional mood swings. But I'm ok right now."   Worry and anxiety have resolved.  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 Summer semester is 5 weeks long, has to read " "300 pages by the end of the week. Notes this is causing some stress, "But it's fine. Everything is fine."    Plan at last appointment:  Patient auto discontinued lamotrigine due to anger, irritability, and SI  Continue bupropion  mg q.a.m. for anxiety and mood  Start escitalopram 5 mg daily for 7 days then increase as tolerated to 10 mg daily for irritability  Did not start doxepin 10 mg q.h.s. p.r.n. insomnia  Offered referral for individual psychotherapy.    Psychotropic medication history:   Prozac (ineffective), Lamictal (effective), Wellbutrin XL (effective), hydroxyzine, melatonin, and trazodone (AM drowsiness)       INTERVAL HISTORY:    Escitalopram was started at last visit and titrated to 10 mg daily. Pt states this has helped to decrease generalized anxiety. "It's great! I feel normal." Mood is stable.   Reports decreased irritability, but states, "when I do get irritable, I feel like I'm more irritable than most people."  She does report fatigue, but is unsure if this is more severe than her usual fatigue. She notes this weekend she slept from 11 PM to 1 PM. 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 She requests increase bupropion dose as increased doses helped improve daytime energy in the past.    No interval episodes with symptoms consistent with shan or hypomania.  Denied interval or current suicidal/homicidal thoughts, intent, or plan or NSSI.  Denied other questions and concerns.    Medication side effects: None  Medication adherence: no    PSYCHIATRIC REVIEW OF SYSTEMS:  Is patient experiencing or having changes in:  Trouble with sleep:  Hypersomnia  Appetite changes:  no  Weight changes:  no  Lack of energy:  Fatigue  Anhedonia:  no   Somatic symptoms:  no  Anxiety/panic:    Irritability:  Proved though residual symptoms remain  Guilty/hopeless:  reports feelings of " "guilt, "about being a good mom"  Concentration: is able to concentrate but procrastinates  Racing thoughts: no  Impulsive behaviors: no  Paranoia/AVH: no  Self-injurious behavior/risky behavior:  no  Any drugs:  no  Alcohol:  no    Psychotherapy:  Target symptoms: recurrent depression, anxiety   Why chosen therapy is appropriate versus another modality: evidence based practice  Outcome monitoring methods: self-report, observation  Therapeutic intervention type: supportive psychotherapy  Topics discussed/themes: building skills sets for symptom management, symptom recognition  The patient's response to the intervention is accepting. The patient's progress toward treatment goals is fair.   Duration of intervention: 16 minutes.    MEDICAL REVIEW OF SYSTEMS:   Pain: Denies any significant chronic or acute pain.  Constitutional: Denies fever or change in appetite.  Cardiovascular: Denies chest pain or exertional dyspnea.  Respiratory: Denies cough or orthopnea.   GI: Denies abdominal pain, N/V  Neurological: Denies tremor, seizure, or focal weakness.  Psychiatric: See HPI above.    PAST PSYCHIATRIC, MEDICAL, AND SOCIAL HISTORY REVIEWED  The patient's past medical, family and social history have been reviewed and updated as appropriate within the electronic medical record - see encounter notes.    PAST MEDICAL HISTORY:   Past Medical History:   Diagnosis Date    History of psychiatric care     History of psychiatric hospitalization     Self-harming behavior     Suicide attempt     Head trauma/Loss of consciousness: denies  Seizures: denies     PAST PSYCHIATRIC HISTORY:  Past Psych Hx: anxiety and mood disorder  Dr Jimenez in 2013  First psych contact: inpatient psych admission at [facility] in 2013 after attempted suicide by unknown means, possible overdose     Prior hospitalizations: 2-3 times at [facility] for suicide attempts; she reports she has difficulty remembering this part of her life and cannot " "remember the method of theses attempts "maybe pills"  Prior suicide attempts or self-harm: 2-3, see above; denies cutting  Prior diagnosis: bipolar disorder, social phobia  Prior psychotherapy: 1 x month with  for last year     FAMILY HISTORY:   Paternal: father hx of ADHD; several family members with alcohol use disorder   Maternal: mother - undiagnosed but anxious, irritable  Siblings: 1 sister with similar symptoms of anxiety    SOCIAL HISTORY:   Childhood: Born in [place], raised in [place]  Marital Status: never   Children: 1 year old sonDevante  Resides: [place]  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[employer]; student  Hobbies: none currently, she reports she is trying to read again, however between her 1 year old and school, she does not have much time for herself  Gnosticism:  denies  Education level: some college, currently attending [employer] majoring in social work, expected graduation date 2023  : denies   Legal: denies  Access to guns: denies    SUBSTANCE USE HISTORY:  Caffeine: Coffee 3-4 cups per coffee/day if she is at work or school  Tobacco:  former smoker, quit in Jan 2020  Alcohol: occasional, approximately once every few months, 2-3 drinks per occasion  Drug use: no  Rehab: no  Prior/current AA: no    MEDICATIONS:    Current Outpatient Medications:     buPROPion (WELLBUTRIN XL) 300 MG 24 hr tablet, Take 1 tablet (300 mg total) by mouth once daily., Disp: 90 tablet, Rfl: 0    drospirenone-ethinyl estradioL (LORYNA, 28,) 3-0.02 mg per tablet, Take 1 tablet by mouth once daily., Disp: 90 tablet, Rfl: 3    EScitalopram oxalate (LEXAPRO) 10 MG tablet, Take 0.5 tablets (5 mg total) by mouth once daily for 7 days, THEN 1 tablet (10 mg total) once daily., Disp: 87 tablet, Rfl: 0    ALLERGIES:  Review of patient's allergies indicates:  No Known Allergies    EXAM:  Constitutional  Vitals:  Most recent vital signs were reviewed.   Last 3 sets of VS:      3/28/2024    11:06 AM " "4/18/2024    10:56 AM 4/26/2024     1:53 PM   Vitals - 1 value per visit   SYSTOLIC 100 91 110   DIASTOLIC 74 70 84   Pulse  93    Weight (lb) 157.63 158.07 156.75   Weight (kg) 71.5 71.7 71.1   Height  5' 4" (1.626 m) 5' 4" (1.626 m)   BMI (Calculated)  27.1 26.9   Pain Score  Zero Zero      General:  unremarkable, age appropriate     Musculoskeletal  Muscle Strength/Tone:  No tremors appreciated   Gait & Station:  Pt seated during virtual visit     Psychiatric  Speech:  no latency; no press   Mood & Affect:  euthymic  congruent and appropriate   Thought Process:  normal and logical   Associations:  intact   Thought Content:  normal, no suicidality, no homicidality, delusions, or paranoia   Insight:  intact   Judgement: behavior is adequate to circumstances   Orientation:  grossly intact   Memory: intact for content of interview   Language: grossly intact   Attention Span & Concentration:  Intact to interview   Fund of Knowledge:  Not tested     SUICIDE RISK ASSESSMENT:  Protective factors: age, gender, no ongoing substance abuse, no psychosis, has a son, denies active SI/intent/plan, seeking treatment, access to treatment, future oriented, good primary support, no access to firearms  Risks: 2-3 prior attempts, 2-3 prior hospitalizations, history MDD, TAZ, BPD  Patient is a low immediate and long-term risk considering risk factors.     RELEVANT LABS/STUDIES:    Lab Results   Component Value Date    WBC 12.29 10/06/2020    HGB 10.1 (L) 10/06/2020    HCT 30.7 (L) 10/06/2020    MCV 85 10/06/2020     (L) 10/06/2020     BMP  Lab Results   Component Value Date     06/08/2017    K 4.1 06/08/2017     06/08/2017    CO2 27 06/08/2017    BUN 11 06/08/2017    CREATININE 0.65 06/08/2017    CALCIUM 9.0 06/08/2017    ANIONGAP 11 06/08/2017    ESTGFRAFRICA >60 06/08/2017    EGFRNONAA >60 06/08/2017     Lab Results   Component Value Date    ALT 11 09/17/2013    AST 14 09/17/2013    ALKPHOS 85 09/17/2013    " "BILITOT 0.7 09/17/2013     Lab Results   Component Value Date    TSH 1.970 06/08/2017     No results found for: "LABA1C", "HGBA1C"  Lab Results   Component Value Date    CHOL 203 (H) 09/17/2013    TRIG 121 09/17/2013    HDL 49 09/17/2013    LDLCALC 129.8 09/17/2013    CHOLHDL 24.1 09/17/2013    TOTALCHOLEST 4.1 09/17/2013       IMPRESSION:    Mirella Kline is a 28 y.o. female with history of MDD, TAZ, BPD, insomnia who presents for follow up appointment.    Status/Progress: Based on the examination today, the patient's problem(s) is/are adequately but not ideally controlled.  New problems have not been presented today.   Co-morbidities are not complicating management of the primary condition.  There are no active rule-out diagnoses for this patient at this time.     Risk Parameters:  Patient reports no suicidal ideation  Patient reports no homicidal ideation  Patient reports no self-injurious behavior  Patient reports no violent behavior    DIAGNOSES:    ICD-10-CM ICD-9-CM   1. Recurrent major depressive disorder, in remission  F33.40 296.35   2. TAZ (generalized anxiety disorder)  F41.1 300.02   3. Borderline personality disorder  F60.3 301.83       PLAN:  Increase bupropion XL from 150 to 300 mg q.a.m. for anxiety, mood, fatigue  Continue escitalopram 10 mg daily for anxiety, mood, irritability  Offered referral for individual psychotherapy.    RETURN TO CLINIC:   6 weeks      EVI Turner, PMHNP-BC      41 minutes of total time spent on the encounter, which includes face to face time and non-face to face time preparing to see the patient (eg. review of tests), obtaining and/or reviewing separately obtained history, documenting clinical information in the electronic health record, independently interpreting results (not separately reported), and communicating results to the patient/family/caregiver, or care coordination (not separately reported).     Visit today included managing the longitudinal " "care of the patient due to the serious and/or complex managed problem(s) MDD, TAZ, BPD.    At this time there are no indications the patient represents an imminent danger to either themselves or others; will continue to manage treatment in the outpatient setting.    I discussed the patient's care with the patient including benefits, alternatives, possible adverse effects of the treatment plan; including the potential for metabolic complications, major organ dysfunction, black box warnings, and contraindications. The opportunity was given for questions/clarification, and after this discussion the above treatment plan was devised through shared decision making. The patient voiced their understanding of the diagnoses and treatments listed above and agreed to the treatment plan. Follow up plan was reviewed with the patient. The patient was advised to call to report any worsening of symptoms or problems with medication.    Supportive therapy and psychoeducation provided. I discussed the importance of regular exercise, maintenance of a healthy weight, balanced diet rich in fruits/vegetables and lean protein, and avoidance of unhealthy habits like smoking and excessive alcohol intake.     Patient has been given crisis information including Suicide and Crisis Lifeline (call or text: 362). Patient also given instructions to go to the nearest ER or call 911 if unable to remain safe or if the Pt develops thoughts of harming self or others.    Documentation entered by me for this encounter may have been done in part using [employer] voice recognition transcription software. Garbled syntax, mangled pronouns, and other bizarre constructions may be attributed to that software system. Although I have made an effort to ensure accuracy, "sound like" errors may exist and should be interpreted in context.    "

## 2024-08-21 RX ORDER — ESCITALOPRAM OXALATE 10 MG/1
10 TABLET ORAL DAILY
Qty: 90 TABLET | Refills: 0 | Status: SHIPPED | OUTPATIENT
Start: 2024-08-21

## 2024-08-27 ENCOUNTER — PATIENT MESSAGE (OUTPATIENT)
Dept: PSYCHIATRY | Facility: CLINIC | Age: 29
End: 2024-08-27
Payer: MEDICAID

## 2024-08-29 ENCOUNTER — OFFICE VISIT (OUTPATIENT)
Dept: PSYCHIATRY | Facility: CLINIC | Age: 29
End: 2024-08-29
Payer: MEDICAID

## 2024-08-29 DIAGNOSIS — R53.83 FATIGUE, UNSPECIFIED TYPE: ICD-10-CM

## 2024-08-29 DIAGNOSIS — F60.3 BORDERLINE PERSONALITY DISORDER: ICD-10-CM

## 2024-08-29 DIAGNOSIS — F41.1 GAD (GENERALIZED ANXIETY DISORDER): ICD-10-CM

## 2024-08-29 DIAGNOSIS — G47.00 INSOMNIA, UNSPECIFIED TYPE: ICD-10-CM

## 2024-08-29 DIAGNOSIS — F33.40 RECURRENT MAJOR DEPRESSIVE DISORDER, IN REMISSION: Primary | ICD-10-CM

## 2024-08-29 RX ORDER — AMITRIPTYLINE HYDROCHLORIDE 10 MG/1
10 TABLET, FILM COATED ORAL NIGHTLY PRN
Qty: 30 TABLET | Refills: 0 | Status: SHIPPED | OUTPATIENT
Start: 2024-08-29 | End: 2025-08-29

## 2024-08-29 NOTE — PROGRESS NOTES
"OUTPATIENT PSYCHIATRY FOLLOW UP VISIT    Encounter Date: 8/29/2024    Clinical Status of Patient:  Outpatient (Virtual)  The patient location is: [address] [place] [address]  The patient phone number is: [phone]   Visit type: Virtual visit with synchronous audio and video  Each patient to whom he or she provides medical services by telemedicine is:  (1) informed of the relationship between the practitioner and patient and the respective role of any other health care provider with respect to management of the patient; and (2) notified that he or she may decline to receive medical services by telemedicine and may withdraw from such care at any time.    Chief Complaint:  Mirella Kline is a 28 y.o. female who presents today for follow-up.  Met with patient.      HISTORY OF PRESENTING ILLNESS:  Mirella Kline is a 28 y.o. female with history of MDD, TAZ, BPD, insomnia who presents for follow up appointment.      5/27/2024: Typical titration of lamotrigine was restarted at last visit for mood.  Today, patient reports that upon increasing lamotrigine to 50 mg daily she experienced irritability and anger as well as suicidal ideation so she discontinued lamotrigine.  She denies suicidal plan or intent and notes suicidal ideation has resolved after discontinuing lamotrigine.  She notes that previously she believed SSRIs caused increased irritability and anxiety in the past but she now believes it was the lamotrigine that was causing the irritability.  She does report continued irritability at baseline.  Bupropion  mg q.a.m. was started at last visit for mood.  Patient denies difficulty restarting bupropion and has continued bupropion.  She did not start doxepin for insomnia as sleep has improved.  Mood has improved. "I'm fine. I get occasional mood swings. But I'm ok right now."   Worry and anxiety have resolved.  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 Summer semester is 5 weeks long, has to " "read 300 pages by the end of the week. Notes this is causing some stress, "But it's fine. Everything is fine."    7/8/2024: Escitalopram was started at last visit and titrated to 10 mg daily. Pt states this has helped to decrease generalized anxiety. "It's great! I feel normal." Mood is stable.   Reports decreased irritability, but states, "when I do get irritable, I feel like I'm more irritable than most people."  She does report fatigue, but is unsure if this is more severe than her usual fatigue. She notes this weekend she slept from 11 PM to 1 PM. 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 She requests increase bupropion dose as increased doses helped improve daytime energy in the past.    Plan at last appointment:  Increase bupropion XL from 150 to 300 mg q.a.m. for anxiety, mood, fatigue  Continue escitalopram 10 mg daily for anxiety, mood, irritability  Offered referral for individual psychotherapy.    Psychotropic medication history:   Prozac (ineffective), Lamictal (anger, irritability, and SI), Wellbutrin XL (effective), hydroxyzine, melatonin, and trazodone (AM drowsiness)       INTERVAL HISTORY:    Bupropion XL was increased from 150 to 300 mg q.a.m. at last visit. Pt reports no change in anxiety, mood, or fatigue.     Mood continues to be stable.  Anxiety continues to be well controlled.  Patient states she is "Happy overall."    She does continue to report irritability though she states this is mainly directed at her boyfriend.  She does state irritability has decreased in frequency and magnitude.    She continues to report significant fatigue throughout the day which has been ongoing and began before starting medications.  She notes difficulty waking up in the morning and states sometimes I get up to do something and I just have to get back in the bed.  Even during the day " "sometimes I just need to lay down."     Avoids caffeine in the afternoon     School is going well.  Continues master's program.      Is patient experiencing or having changes in:  Trouble with sleep:  Reports initial insomnia  Appetite changes:  no  Weight changes:  no  Lack of energy:  Fatigue  Anhedonia:  no   Somatic symptoms:  no  Anxiety/panic:  well controlled  Irritability: continued irritability  Guilty/hopeless:  reports feelings of guilt, "about being a good mom"  Concentration: is able to concentrate but procrastinates  Racing thoughts: no  Impulsive behaviors: no  Paranoia/AVH: no  Self-injurious behavior/risky behavior:  no  Any drugs:  no  Alcohol:  no    No interval episodes with symptoms consistent with shan or hypomania.  Denied interval or current suicidal/homicidal thoughts, intent, or plan or NSSI.  Denied other questions and concerns.    Medication side effects:  vivid dreams    Medication adherence: yes    Psychotherapy:  Target symptoms: recurrent depression, anxiety   Why chosen therapy is appropriate versus another modality: evidence based practice  Outcome monitoring methods: self-report, observation  Therapeutic intervention type: supportive psychotherapy  Topics discussed/themes: building skills sets for symptom management, symptom recognition  The patient's response to the intervention is accepting. The patient's progress toward treatment goals is fair.   Duration of intervention: 16 minutes.    MEDICAL REVIEW OF SYSTEMS:   Pain: Denies any significant chronic or acute pain.  Constitutional: Denies fever or change in appetite.  Cardiovascular: Denies chest pain or exertional dyspnea.  Respiratory: Denies cough or orthopnea.   GI: Denies abdominal pain, N/V  Neurological: Denies tremor, seizure, or focal weakness.  Psychiatric: See HPI above.    PAST PSYCHIATRIC, MEDICAL, AND SOCIAL HISTORY REVIEWED  The patient's past medical, family and social history have been reviewed and updated as " "appropriate within the electronic medical record - see encounter notes.    PAST MEDICAL HISTORY:   Past Medical History:   Diagnosis Date    History of psychiatric care     History of psychiatric hospitalization     Self-harming behavior     Suicide attempt     Head trauma/Loss of consciousness: denies  Seizures: denies     PAST PSYCHIATRIC HISTORY:  Past Psych Hx: anxiety and mood disorder  Dr Jimenez in 2013  First psych contact: inpatient psych admission at [facility] in 2013 after attempted suicide by unknown means, possible overdose     Prior hospitalizations: 2-3 times at [facility] for suicide attempts; she reports she has difficulty remembering this part of her life and cannot remember the method of theses attempts "maybe pills"  Prior suicide attempts or self-harm: 2-3, see above; denies cutting  Prior diagnosis: bipolar disorder, social phobia  Prior psychotherapy: 1 x month with  for last year     FAMILY HISTORY:   Paternal: father hx of ADHD; several family members with alcohol use disorder   Maternal: mother - undiagnosed but anxious, irritable  Siblings: 1 sister with similar symptoms of anxiety    SOCIAL HISTORY:   Childhood: Born in [place], raised in [place]  Marital Status: never   Children: 1 year old sonDveante  Resides: [place]  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[employer]; student  Hobbies: none currently, she reports she is trying to read again, however between her 1 year old and school, she does not have much time for herself  Rastafari:  denies  Education level: some college, currently attending [employer] majoring in social work, expected graduation date 2023  : denies   Legal: denies  Access to guns: denies    SUBSTANCE USE HISTORY:  Caffeine: Coffee 3-4 cups per coffee/day if she is at work or school  Tobacco:  former smoker, quit in Jan 2020  Alcohol: occasional, approximately once every few months, 2-3 drinks per occasion  Drug use: " "no  Rehab: no  Prior/current AA: no    MEDICATIONS:    Current Outpatient Medications:     amitriptyline (ELAVIL) 10 MG tablet, Take 1 tablet (10 mg total) by mouth nightly as needed for Insomnia., Disp: 30 tablet, Rfl: 0    buPROPion (WELLBUTRIN XL) 300 MG 24 hr tablet, Take 1 tablet (300 mg total) by mouth once daily., Disp: 90 tablet, Rfl: 0    drospirenone-ethinyl estradioL (LORYNA, 28,) 3-0.02 mg per tablet, Take 1 tablet by mouth once daily., Disp: 90 tablet, Rfl: 3    EScitalopram oxalate (LEXAPRO) 10 MG tablet, Take 1 tablet (10 mg total) by mouth once daily., Disp: 90 tablet, Rfl: 0    ALLERGIES:  Review of patient's allergies indicates:  No Known Allergies    EXAM:  Constitutional  Vitals:  Most recent vital signs were reviewed.   Last 3 sets of VS:      3/28/2024    11:06 AM 4/18/2024    10:56 AM 4/26/2024     1:53 PM   Vitals - 1 value per visit   SYSTOLIC 100 91 110   DIASTOLIC 74 70 84   Pulse  93    Weight (lb) 157.63 158.07 156.75   Weight (kg) 71.5 71.7 71.1   Height  5' 4" (1.626 m) 5' 4" (1.626 m)   BMI (Calculated)  27.1 26.9   Pain Score  Zero Zero      General:  unremarkable, age appropriate     Musculoskeletal  Muscle Strength/Tone:  No tremors appreciated   Gait & Station:  Pt seated during virtual visit     Psychiatric  Speech:  no latency; no press   Mood & Affect:  euthymic  congruent and appropriate   Thought Process:  normal and logical   Associations:  intact   Thought Content:  normal, no suicidality, no homicidality, delusions, or paranoia   Insight:  intact   Judgement: behavior is adequate to circumstances   Orientation:  grossly intact   Memory: intact for content of interview   Language: grossly intact   Attention Span & Concentration:  Intact to interview   Fund of Knowledge:  Not tested     SUICIDE RISK ASSESSMENT:  Protective factors: age, gender, no ongoing substance abuse, no psychosis, has a son, denies active SI/intent/plan, seeking treatment, access to treatment, future " "oriented, good primary support, no access to firearms  Risks: 2-3 prior attempts, 2-3 prior hospitalizations, history MDD, TAZ, BPD  Patient is a low immediate and long-term risk considering risk factors.     RELEVANT LABS/STUDIES:    Lab Results   Component Value Date    WBC 12.29 10/06/2020    HGB 10.1 (L) 10/06/2020    HCT 30.7 (L) 10/06/2020    MCV 85 10/06/2020     (L) 10/06/2020     BMP  Lab Results   Component Value Date     06/08/2017    K 4.1 06/08/2017     06/08/2017    CO2 27 06/08/2017    BUN 11 06/08/2017    CREATININE 0.65 06/08/2017    CALCIUM 9.0 06/08/2017    ANIONGAP 11 06/08/2017    ESTGFRAFRICA >60 06/08/2017    EGFRNONAA >60 06/08/2017     Lab Results   Component Value Date    ALT 11 09/17/2013    AST 14 09/17/2013    ALKPHOS 85 09/17/2013    BILITOT 0.7 09/17/2013     Lab Results   Component Value Date    TSH 1.970 06/08/2017     No results found for: "LABA1C", "HGBA1C"  Lab Results   Component Value Date    CHOL 203 (H) 09/17/2013    TRIG 121 09/17/2013    HDL 49 09/17/2013    LDLCALC 129.8 09/17/2013    CHOLHDL 24.1 09/17/2013    TOTALCHOLEST 4.1 09/17/2013       IMPRESSION:    Mirella Kline is a 28 y.o. female with history of MDD, TAZ, BPD, insomnia who presents for follow up appointment.    Status/Progress: Based on the examination today, the patient's problem(s) is/are adequately but not ideally controlled.  New problems have not been presented today.   Co-morbidities are not complicating management of the primary condition.  There are no active rule-out diagnoses for this patient at this time.     Patient reports mood has been stable and anxiety is well controlled.  However, she continues to report severe fatigue.  She does not have a primary care provider.  Discussed the importance of a primary care provider with patient who expressed understanding and will call her insurance company to find a primary care provider.  In the meantime, CBC, CMP, TSH, vitamin-D, and " vitamin B12 have been ordered to rule out causes for fatigue.  She does reports difficulty with initial insomnia.  Through shared decision-making amitriptyline 10 mg q.h.s. p.r.n. insomnia will be started.    Risk Parameters:  Patient reports no suicidal ideation  Patient reports no homicidal ideation  Patient reports no self-injurious behavior  Patient reports no violent behavior    DIAGNOSES:    ICD-10-CM ICD-9-CM   1. Recurrent major depressive disorder, in remission  F33.40 296.35   2. Fatigue, unspecified type  R53.83 780.79   3. TAZ (generalized anxiety disorder)  F41.1 300.02   4. Borderline personality disorder  F60.3 301.83   5. Insomnia, unspecified type  G47.00 780.52       PLAN:  Continue bupropion  mg q.a.m. for anxiety, mood, fatigue  Continue escitalopram 10 mg daily for anxiety, mood, irritability  Start amitriptyline 10 mg q.h.s. p.r.n. insomnia  Offered referral for individual psychotherapy.  CBC, CMP, TSH, vitamin-D, vitamin B12 ordered    RETURN TO CLINIC:   1 month      Kristy Alvarez, APRN, PMHNP-BC      40 minutes of total time spent on the encounter, which includes face to face time and non-face to face time preparing to see the patient (eg. review of tests), obtaining and/or reviewing separately obtained history, documenting clinical information in the electronic health record, independently interpreting results (not separately reported), and communicating results to the patient/family/caregiver, or care coordination (not separately reported).     Visit today included managing the longitudinal care of the patient due to the serious and/or complex managed problem(s) MDD, TAZ, BPD.    At this time there are no indications the patient represents an imminent danger to either themselves or others; will continue to manage treatment in the outpatient setting.    I discussed the patient's care with the patient including benefits, alternatives, possible adverse effects of the treatment plan;  "including the potential for metabolic complications, major organ dysfunction, black box warnings, and contraindications. The opportunity was given for questions/clarification, and after this discussion the above treatment plan was devised through shared decision making. The patient voiced their understanding of the diagnoses and treatments listed above and agreed to the treatment plan. Follow up plan was reviewed with the patient. The patient was advised to call to report any worsening of symptoms or problems with medication.    Supportive therapy and psychoeducation provided. I discussed the importance of regular exercise, maintenance of a healthy weight, balanced diet rich in fruits/vegetables and lean protein, and avoidance of unhealthy habits like smoking and excessive alcohol intake.     Patient has been given crisis information including Suicide and Crisis Lifeline (call or text: [phone]). Patient also given instructions to go to the nearest ER or call 911 if unable to remain safe or if the Pt develops thoughts of harming self or others.    Documentation entered by me for this encounter may have been done in part using [employer] voice recognition transcription software. Garbled syntax, mangled pronouns, and other bizarre constructions may be attributed to that software system. Although I have made an effort to ensure accuracy, "sound like" errors may exist and should be interpreted in context.      "

## 2024-09-20 RX ORDER — AMITRIPTYLINE HYDROCHLORIDE 10 MG/1
10 TABLET, FILM COATED ORAL NIGHTLY
Qty: 30 TABLET | Refills: 0 | Status: SHIPPED | OUTPATIENT
Start: 2024-09-20

## 2024-09-22 ENCOUNTER — PATIENT MESSAGE (OUTPATIENT)
Dept: PSYCHIATRY | Facility: CLINIC | Age: 29
End: 2024-09-22
Payer: MEDICAID

## 2024-09-23 RX ORDER — ESCITALOPRAM OXALATE 10 MG/1
10 TABLET ORAL
Qty: 90 TABLET | Refills: 1 | Status: SHIPPED | OUTPATIENT
Start: 2024-09-23

## 2024-09-25 ENCOUNTER — LAB VISIT (OUTPATIENT)
Dept: LAB | Facility: HOSPITAL | Age: 29
End: 2024-09-25
Payer: MEDICAID

## 2024-09-25 DIAGNOSIS — R53.83 FATIGUE, UNSPECIFIED TYPE: ICD-10-CM

## 2024-09-25 DIAGNOSIS — F33.40 RECURRENT MAJOR DEPRESSIVE DISORDER, IN REMISSION: ICD-10-CM

## 2024-09-25 LAB
ALBUMIN SERPL BCP-MCNC: 4.1 G/DL (ref 3.5–5.2)
ALP SERPL-CCNC: 75 U/L (ref 55–135)
ALT SERPL W/O P-5'-P-CCNC: 20 U/L (ref 10–44)
ANION GAP SERPL CALC-SCNC: 10 MMOL/L (ref 8–16)
AST SERPL-CCNC: 17 U/L (ref 10–40)
BASOPHILS # BLD AUTO: 0.05 K/UL (ref 0–0.2)
BASOPHILS NFR BLD: 0.8 % (ref 0–1.9)
BILIRUB SERPL-MCNC: 0.4 MG/DL (ref 0.1–1)
BUN SERPL-MCNC: 8 MG/DL (ref 6–20)
CALCIUM SERPL-MCNC: 9.4 MG/DL (ref 8.7–10.5)
CHLORIDE SERPL-SCNC: 105 MMOL/L (ref 95–110)
CO2 SERPL-SCNC: 21 MMOL/L (ref 23–29)
CREAT SERPL-MCNC: 0.8 MG/DL (ref 0.5–1.4)
DIFFERENTIAL METHOD BLD: NORMAL
EOSINOPHIL # BLD AUTO: 0 K/UL (ref 0–0.5)
EOSINOPHIL NFR BLD: 0.7 % (ref 0–8)
ERYTHROCYTE [DISTWIDTH] IN BLOOD BY AUTOMATED COUNT: 11.7 % (ref 11.5–14.5)
EST. GFR  (NO RACE VARIABLE): >60 ML/MIN/1.73 M^2
GLUCOSE SERPL-MCNC: 88 MG/DL (ref 70–110)
HCT VFR BLD AUTO: 42.5 % (ref 37–48.5)
HGB BLD-MCNC: 14.2 G/DL (ref 12–16)
IMM GRANULOCYTES # BLD AUTO: 0.01 K/UL (ref 0–0.04)
IMM GRANULOCYTES NFR BLD AUTO: 0.2 % (ref 0–0.5)
LYMPHOCYTES # BLD AUTO: 1.9 K/UL (ref 1–4.8)
LYMPHOCYTES NFR BLD: 30.9 % (ref 18–48)
MCH RBC QN AUTO: 30.5 PG (ref 27–31)
MCHC RBC AUTO-ENTMCNC: 33.4 G/DL (ref 32–36)
MCV RBC AUTO: 91 FL (ref 82–98)
MONOCYTES # BLD AUTO: 0.3 K/UL (ref 0.3–1)
MONOCYTES NFR BLD: 5.6 % (ref 4–15)
NEUTROPHILS # BLD AUTO: 3.8 K/UL (ref 1.8–7.7)
NEUTROPHILS NFR BLD: 61.8 % (ref 38–73)
NRBC BLD-RTO: 0 /100 WBC
PLATELET # BLD AUTO: 262 K/UL (ref 150–450)
PMV BLD AUTO: 12.9 FL (ref 9.2–12.9)
POTASSIUM SERPL-SCNC: 4.2 MMOL/L (ref 3.5–5.1)
PROT SERPL-MCNC: 7.4 G/DL (ref 6–8.4)
RBC # BLD AUTO: 4.65 M/UL (ref 4–5.4)
SODIUM SERPL-SCNC: 136 MMOL/L (ref 136–145)
TSH SERPL DL<=0.005 MIU/L-ACNC: 1.04 UIU/ML (ref 0.4–4)
VIT B12 SERPL-MCNC: 461 PG/ML (ref 210–950)
WBC # BLD AUTO: 6.12 K/UL (ref 3.9–12.7)

## 2024-09-25 PROCEDURE — 84443 ASSAY THYROID STIM HORMONE: CPT

## 2024-09-25 PROCEDURE — 82652 VIT D 1 25-DIHYDROXY: CPT

## 2024-09-25 PROCEDURE — 80053 COMPREHEN METABOLIC PANEL: CPT

## 2024-09-25 PROCEDURE — 82607 VITAMIN B-12: CPT

## 2024-09-25 PROCEDURE — 85025 COMPLETE CBC W/AUTO DIFF WBC: CPT

## 2024-09-30 ENCOUNTER — OFFICE VISIT (OUTPATIENT)
Dept: PSYCHIATRY | Facility: CLINIC | Age: 29
End: 2024-09-30
Payer: MEDICAID

## 2024-09-30 DIAGNOSIS — G47.00 INSOMNIA, UNSPECIFIED TYPE: ICD-10-CM

## 2024-09-30 DIAGNOSIS — F33.40 RECURRENT MAJOR DEPRESSIVE DISORDER, IN REMISSION: Primary | ICD-10-CM

## 2024-09-30 DIAGNOSIS — F41.1 GAD (GENERALIZED ANXIETY DISORDER): ICD-10-CM

## 2024-09-30 DIAGNOSIS — F60.3 BORDERLINE PERSONALITY DISORDER: ICD-10-CM

## 2024-09-30 DIAGNOSIS — R53.83 FATIGUE, UNSPECIFIED TYPE: ICD-10-CM

## 2024-09-30 LAB — 1,25(OH)2D3 SERPL-MCNC: 77 PG/ML (ref 20–79)

## 2024-09-30 PROCEDURE — G2211 COMPLEX E/M VISIT ADD ON: HCPCS | Mod: 95,,,

## 2024-09-30 PROCEDURE — 99214 OFFICE O/P EST MOD 30 MIN: CPT | Mod: 95,,,

## 2024-09-30 PROCEDURE — 1159F MED LIST DOCD IN RCRD: CPT | Mod: CPTII,95,,

## 2024-09-30 PROCEDURE — 1160F RVW MEDS BY RX/DR IN RCRD: CPT | Mod: CPTII,95,,

## 2024-09-30 NOTE — PROGRESS NOTES
"OUTPATIENT PSYCHIATRY FOLLOW UP VISIT    Encounter Date: 9/30/2024    Clinical Status of Patient:  Outpatient (Virtual)  The patient location is: [address] [place] [address]  The patient phone number is: [phone]   Visit type: Virtual visit with synchronous audio and video  Each patient to whom he or she provides medical services by telemedicine is:  (1) informed of the relationship between the practitioner and patient and the respective role of any other health care provider with respect to management of the patient; and (2) notified that he or she may decline to receive medical services by telemedicine and may withdraw from such care at any time.    Chief Complaint:  Mirella Kline is a 29 y.o. female who presents today for follow-up.  Met with patient.      HISTORY OF PRESENTING ILLNESS:  Mirella Kline is a 29 y.o. female with history of MDD, TAZ, BPD, insomnia who presents for follow up appointment.      Plan at last appointment:  Continue bupropion  mg q.a.m. for anxiety, mood, fatigue  Continue escitalopram 10 mg daily for anxiety, mood, irritability  Start amitriptyline 10 mg q.h.s. p.r.n. insomnia  Offered referral for individual psychotherapy.  CBC, CMP, TSH, vitamin-D, vitamin B12 ordered    Psychotropic medication history:   Prozac (ineffective), Lamictal (anger, irritability, and SI), Wellbutrin XL (effective), hydroxyzine, melatonin, and trazodone (AM drowsiness), amitriptyline (ineffective)       INTERVAL HISTORY:    Elavil was started at last visit for insomnia without relief of symptoms. She auto-discontinued after 2-3 doses.    Mood continues to be stable.  Anxiety continues to be well controlled.  Patient states she is "Happy overall."    Irritability is decreased and well controlled.        Continues to feel tired and fatigued during the day. Reports this is chronic since high school. Does not exercise on a regular basis. Does eat whole foods the majority of the time. " "Skips breakfast.  Increased caffeine intake recently. Reports easy bruising over the last several weeks.     School is going well.  Continues master's program.    Is patient experiencing or having changes in:  Trouble with sleep:  Reports continued initial insomnia  Appetite changes:  no  Weight changes:  no  Lack of energy: continued chronic fatigue  Anhedonia:  no   Somatic symptoms:  no  Anxiety/panic:  well controlled  Irritability: well controlled  Guilty/hopeless:  reports feelings of guilt, "about being a good mom"  Concentration: is able to concentrate but procrastinates  Racing thoughts: no  Impulsive behaviors: no  Paranoia/AVH: no  Self-injurious behavior/risky behavior:  no  Any drugs:  no  Alcohol:  no    No interval episodes with symptoms consistent with shan or hypomania.  Denied interval or current suicidal/homicidal thoughts, intent, or plan or NSSI.  Denied other questions and concerns.    Medication side effects:  vivid dreams    Medication adherence: yes    Psychotherapy:  Target symptoms: recurrent depression, anxiety   Why chosen therapy is appropriate versus another modality: evidence based practice  Outcome monitoring methods: self-report, observation  Therapeutic intervention type: supportive psychotherapy  Topics discussed/themes: building skills sets for symptom management, symptom recognition  The patient's response to the intervention is accepting. The patient's progress toward treatment goals is fair.   Duration of intervention: 10 minutes.    MEDICAL REVIEW OF SYSTEMS:   Pain: Denies any significant chronic or acute pain.  Constitutional: Denies fever or change in appetite.  Cardiovascular: Denies chest pain or exertional dyspnea.  Respiratory: Denies cough or orthopnea.   GI: Denies abdominal pain, N/V  Neurological: Denies tremor, seizure, or focal weakness.  Psychiatric: See HPI above.    PAST PSYCHIATRIC, MEDICAL, AND SOCIAL HISTORY REVIEWED  The patient's past medical, family and " "social history have been reviewed and updated as appropriate within the electronic medical record - see encounter notes.    PAST MEDICAL HISTORY:   Past Medical History:   Diagnosis Date    History of psychiatric care     History of psychiatric hospitalization     Self-harming behavior     Suicide attempt     Head trauma/Loss of consciousness: denies  Seizures: denies     PAST PSYCHIATRIC HISTORY:  Past Psych Hx: anxiety and mood disorder  Dr Jimenez in 2013  First psych contact: inpatient psych admission at [facility] in 2013 after attempted suicide by unknown means, possible overdose     Prior hospitalizations: 2-3 times at [facility] for suicide attempts; she reports she has difficulty remembering this part of her life and cannot remember the method of theses attempts "maybe pills"  Prior suicide attempts or self-harm: 2-3, see above; denies cutting  Prior diagnosis: bipolar disorder, social phobia  Prior psychotherapy: 1 x month with  for last year     FAMILY HISTORY:   Paternal: father hx of ADHD; several family members with alcohol use disorder   Maternal: mother - undiagnosed but anxious, irritable  Siblings: 1 sister with similar symptoms of anxiety    SOCIAL HISTORY:   Childhood: Born in [place], raised in [place]  Marital Status: never   Children: 1 year old sonDevante  Resides: [place]  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[employer]; student  Hobbies: none currently, she reports she is trying to read again, however between her 1 year old and school, she does not have much time for herself  Orthodoxy:  denies  Education level: some college, currently attending [employer] majoring in social work, expected graduation date 2023  : denies   Legal: denies  Access to guns: denies    SUBSTANCE USE HISTORY:  Caffeine: Coffee 3-4 cups per coffee/day if she is at work or school  Tobacco:  former smoker, quit in Jan 2020  Alcohol: occasional, approximately once every " "few months, 2-3 drinks per occasion  Drug use: no  Rehab: no  Prior/current AA: no    MEDICATIONS:    Current Outpatient Medications:     amitriptyline (ELAVIL) 10 MG tablet, TAKE 1 TABLET BY MOUTH NIGHTLY AS NEEDED FOR INSOMNIA., Disp: 30 tablet, Rfl: 0    buPROPion (WELLBUTRIN XL) 300 MG 24 hr tablet, Take 1 tablet (300 mg total) by mouth once daily., Disp: 90 tablet, Rfl: 0    drospirenone-ethinyl estradioL (LORYNA, 28,) 3-0.02 mg per tablet, Take 1 tablet by mouth once daily., Disp: 90 tablet, Rfl: 3    EScitalopram oxalate (LEXAPRO) 10 MG tablet, TAKE 1 TABLET BY MOUTH EVERY DAY, Disp: 90 tablet, Rfl: 1    ALLERGIES:  Review of patient's allergies indicates:  No Known Allergies    EXAM:  Constitutional  Vitals:  Most recent vital signs were reviewed.   Last 3 sets of VS:      3/28/2024    11:06 AM 4/18/2024    10:56 AM 4/26/2024     1:53 PM   Vitals - 1 value per visit   SYSTOLIC 100 91 110   DIASTOLIC 74 70 84   Pulse  93    Weight (lb) 157.63 158.07 156.75   Weight (kg) 71.5 71.7 71.1   Height  5' 4" (1.626 m) 5' 4" (1.626 m)   BMI (Calculated)  27.1 26.9   Pain Score  Zero Zero      General:  unremarkable, age appropriate     Musculoskeletal  Muscle Strength/Tone:  No tremors appreciated   Gait & Station:  Pt seated during virtual visit     Psychiatric  Speech:  no latency; no press   Mood & Affect:  euthymic  congruent and appropriate   Thought Process:  normal and logical   Associations:  intact   Thought Content:  normal, no suicidality, no homicidality, delusions, or paranoia   Insight:  intact   Judgement: behavior is adequate to circumstances   Orientation:  grossly intact   Memory: intact for content of interview   Language: grossly intact   Attention Span & Concentration:  Intact to interview   Fund of Knowledge:  Not tested     SUICIDE RISK ASSESSMENT:  Protective factors: age, gender, no ongoing substance abuse, no psychosis, has a son, denies active SI/intent/plan, seeking treatment, access to " "treatment, future oriented, good primary support, no access to firearms  Risks: 2-3 prior attempts, 2-3 prior hospitalizations, history MDD, TAZ, BPD  Patient is a low immediate and long-term risk considering risk factors.     RELEVANT LABS/STUDIES:    Lab Results   Component Value Date    WBC 6.12 09/25/2024    HGB 14.2 09/25/2024    HCT 42.5 09/25/2024    MCV 91 09/25/2024     09/25/2024     BMP  Lab Results   Component Value Date     09/25/2024    K 4.2 09/25/2024     09/25/2024    CO2 21 (L) 09/25/2024    BUN 8 09/25/2024    CREATININE 0.8 09/25/2024    CALCIUM 9.4 09/25/2024    ANIONGAP 10 09/25/2024    ESTGFRAFRICA >60 06/08/2017    EGFRNONAA >60 06/08/2017     Lab Results   Component Value Date    ALT 20 09/25/2024    AST 17 09/25/2024    ALKPHOS 75 09/25/2024    BILITOT 0.4 09/25/2024     Lab Results   Component Value Date    TSH 1.042 09/25/2024     No results found for: "LABA1C", "HGBA1C"  Lab Results   Component Value Date    CHOL 203 (H) 09/17/2013    TRIG 121 09/17/2013    HDL 49 09/17/2013    LDLCALC 129.8 09/17/2013    CHOLHDL 24.1 09/17/2013    TOTALCHOLEST 4.1 09/17/2013       IMPRESSION:    Mirella Kline is a 29 y.o. female with history of MDD, TAZ, BPD, insomnia who presents for follow up appointment.    Status/Progress: Based on the examination today, the patient's problem(s) is/are adequately but not ideally controlled.  New problems have not been presented today.   Co-morbidities are not complicating management of the primary condition.  There are no active rule-out diagnoses for this patient at this time.     Patient reports mood has been stable and anxiety is well controlled.  However, she continues to report severe fatigue.  She does not have a primary care provider.  Again discussed the importance of a primary care provider with patient who expressed understanding and will call her insurance company to find a primary care provider.  CBC, CMP, TSH, and vitamin B12  " are WNL. She reports continued initial insomnia.  She does not wish to trial any other medications for insomnia.     Risk Parameters:  Patient reports no suicidal ideation  Patient reports no homicidal ideation  Patient reports no self-injurious behavior  Patient reports no violent behavior    DIAGNOSES:    ICD-10-CM ICD-9-CM   1. Recurrent major depressive disorder, in remission  F33.40 296.35   2. TAZ (generalized anxiety disorder)  F41.1 300.02   3. Borderline personality disorder  F60.3 301.83   4. Insomnia, unspecified type  G47.00 780.52   5. Fatigue, unspecified type  R53.83 780.79       PLAN:  Continue bupropion  mg q.a.m. for anxiety, mood, fatigue  Continue escitalopram 10 mg daily for anxiety, mood, irritability  Offered referral for individual psychotherapy.      RETURN TO CLINIC:   3 months      EVI Turner, PMHNP-BC    40 minutes of total time spent on the encounter, which includes face to face time and non-face to face time preparing to see the patient (eg. review of tests), obtaining and/or reviewing separately obtained history, documenting clinical information in the electronic health record, independently interpreting results (not separately reported), and communicating results to the patient/family/caregiver, or care coordination (not separately reported).     Visit today included managing the longitudinal care of the patient due to the serious and/or complex managed problem(s) MDD, TAZ, BPD.    At this time there are no indications the patient represents an imminent danger to either themselves or others; will continue to manage treatment in the outpatient setting.    I discussed the patient's care with the patient including benefits, alternatives, possible adverse effects of the treatment plan; including the potential for metabolic complications, major organ dysfunction, black box warnings, and contraindications. The opportunity was given for questions/clarification, and after  this discussion the above treatment plan was devised through shared decision making. The patient voiced their understanding of the diagnoses and treatments listed above and agreed to the treatment plan. Follow up plan was reviewed with the patient. The patient was advised to call to report any worsening of symptoms or problems with medication.    Supportive therapy and psychoeducation provided. I discussed the importance of regular exercise, maintenance of a healthy weight, balanced diet rich in fruits/vegetables and lean protein, and avoidance of unhealthy habits like smoking and excessive alcohol intake.     Patient has been given crisis information including Suicide and Crisis Lifeline (call or text: [phone]). Patient also given instructions to go to the nearest ER or call 911 if unable to remain safe or if the Pt develops thoughts of harming self or others.    Documentation entered by me for this encounter may have been done in part using [employer] Direct voice recognition transcription software. Garbled syntax, mangled pronouns, and other bizarre constructions may be attributed to that software system.

## 2024-10-11 ENCOUNTER — TELEPHONE (OUTPATIENT)
Dept: PSYCHIATRY | Facility: CLINIC | Age: 29
End: 2024-10-11
Payer: MEDICAID

## 2024-10-14 RX ORDER — AMITRIPTYLINE HYDROCHLORIDE 10 MG/1
10 TABLET, FILM COATED ORAL NIGHTLY
Qty: 90 TABLET | Refills: 1 | Status: SHIPPED | OUTPATIENT
Start: 2024-10-14

## 2024-10-14 NOTE — TELEPHONE ENCOUNTER
Last ordered: 3 weeks ago (9/20/2024) by Kristy Alvarez NP     Last refill: 9/20/2024    Rx #: [id number]    Pharmacy comment: REQUEST FOR 90 DAYS PRESCRIPTION       Nov none - due around 12/30/24   Lov 9/30/24

## 2025-03-04 DIAGNOSIS — Z30.9 ENCOUNTER FOR CONTRACEPTIVE MANAGEMENT, UNSPECIFIED TYPE: ICD-10-CM

## 2025-03-05 RX ORDER — DROSPIRENONE AND ETHINYL ESTRADIOL 0.02-3(28)
1 KIT ORAL DAILY
Qty: 28 TABLET | Refills: 3 | Status: SHIPPED | OUTPATIENT
Start: 2025-03-05

## 2025-03-13 RX ORDER — ESCITALOPRAM OXALATE 10 MG/1
10 TABLET ORAL
Qty: 30 TABLET | Refills: 0 | Status: SHIPPED | OUTPATIENT
Start: 2025-03-13

## 2025-03-13 NOTE — TELEPHONE ENCOUNTER
LDO - 9/23/24  LOV - 9/30/24 @ 4:00 PM ( Virtual )  FOV - None Scheduled    Called PT 3/13 @ 8:12 am. Left a Voicemail to schedule an IN PERSON follow up.

## 2025-05-29 ENCOUNTER — OFFICE VISIT (OUTPATIENT)
Dept: OBSTETRICS AND GYNECOLOGY | Facility: CLINIC | Age: 30
End: 2025-05-29
Payer: MEDICAID

## 2025-05-29 VITALS
SYSTOLIC BLOOD PRESSURE: 106 MMHG | DIASTOLIC BLOOD PRESSURE: 68 MMHG | HEIGHT: 64 IN | BODY MASS INDEX: 23.18 KG/M2 | WEIGHT: 135.81 LBS

## 2025-05-29 DIAGNOSIS — Z01.419 WELL WOMAN EXAM: Primary | ICD-10-CM

## 2025-05-29 DIAGNOSIS — Z30.9 ENCOUNTER FOR CONTRACEPTIVE MANAGEMENT, UNSPECIFIED TYPE: ICD-10-CM

## 2025-05-29 PROCEDURE — 99213 OFFICE O/P EST LOW 20 MIN: CPT | Mod: PBBFAC,PN | Performed by: STUDENT IN AN ORGANIZED HEALTH CARE EDUCATION/TRAINING PROGRAM

## 2025-05-29 PROCEDURE — 99999 PR PBB SHADOW E&M-EST. PATIENT-LVL III: CPT | Mod: PBBFAC,,, | Performed by: STUDENT IN AN ORGANIZED HEALTH CARE EDUCATION/TRAINING PROGRAM

## 2025-05-29 RX ORDER — DROSPIRENONE AND ETHINYL ESTRADIOL 0.02-3(28)
1 KIT ORAL DAILY
Qty: 90 TABLET | Refills: 3 | Status: SHIPPED | OUTPATIENT
Start: 2025-05-29

## 2025-05-29 NOTE — PROGRESS NOTES
History & Physical  Gynecology      SUBJECTIVE:     Chief Complaint: Annual Exam       History of Present Illness:    Here today for f/u on OCPS and annual exam.     Gyn: regular periods on OCPs. No hx of STI. No hx of abnormal pap. No immediate FH of gyn or colon cancer. S/p HPV      Still vaping       Review of patient's allergies indicates:  No Known Allergies    Past Medical History:   Diagnosis Date    History of psychiatric care     History of psychiatric hospitalization     Self-harming behavior     Suicide attempt      Past Surgical History:   Procedure Laterality Date    ADENOIDECTOMY      TONSILLECTOMY       OB History          1    Para   1    Term   1       0    AB   0    Living   1         SAB   0    IAB   0    Ectopic   0    Multiple   0    Live Births   1               Family History   Problem Relation Name Age of Onset    Diabetes Paternal Grandmother      Bipolar disorder Neg Hx      Schizophrenia Neg Hx      Breast cancer Neg Hx      Ovarian cancer Neg Hx      Uterine cancer Neg Hx      Colon cancer Neg Hx       Social History     Tobacco Use    Smoking status: Every Day     Types: Vaping with nicotine    Smokeless tobacco: Never   Substance Use Topics    Alcohol use: Yes     Comment: social     Drug use: Never       Current Outpatient Medications   Medication Sig    drospirenone-ethinyl estradioL (LORYNA, 28,) 3-0.02 mg per tablet Take 1 tablet by mouth once daily.    amitriptyline (ELAVIL) 10 MG tablet TAKE 1 TABLET BY MOUTH NIGHTLY AS NEEDED FOR INSOMNIA. (Patient not taking: Reported on 2025)    buPROPion (WELLBUTRIN XL) 300 MG 24 hr tablet Take 1 tablet (300 mg total) by mouth once daily. (Patient not taking: Reported on 2025)    EScitalopram oxalate (LEXAPRO) 10 MG tablet TAKE 1 TABLET BY MOUTH EVERY DAY (Patient not taking: Reported on 2025)     No current facility-administered medications for this visit.         Review of Systems:  Review of Systems    Constitutional:  Negative for chills, fatigue and fever.   HENT:  Negative for congestion.    Eyes:  Negative for visual disturbance.   Respiratory:  Negative for cough and shortness of breath.    Cardiovascular:  Negative for chest pain and palpitations.   Gastrointestinal:  Negative for abdominal distention, abdominal pain, constipation, diarrhea, nausea and vomiting.   Genitourinary:  Negative for difficulty urinating, dysuria, hematuria, vaginal bleeding and vaginal discharge.   Skin:  Negative for rash.   Neurological:  Negative for dizziness, seizures, light-headedness and headaches.   Hematological:  Does not bruise/bleed easily.   Psychiatric/Behavioral:  Negative for dysphoric mood. The patient is not nervous/anxious.         OBJECTIVE:     Physical Exam:  Physical Exam  Vitals reviewed.   Constitutional:       General: She is not in acute distress.     Appearance: She is well-developed.   HENT:      Head: Normocephalic and atraumatic.   Cardiovascular:      Rate and Rhythm: Normal rate and regular rhythm.   Pulmonary:      Effort: Pulmonary effort is normal.   Chest:   Breasts:     Right: No inverted nipple, mass, nipple discharge, skin change or tenderness.      Left: No inverted nipple, mass, nipple discharge, skin change or tenderness.   Abdominal:      General: There is no distension.      Palpations: Abdomen is soft.   Genitourinary:     Vagina: Normal.      Comments: Normal external female genitalia, normal hair distribution. Vaginal mucosa pink, moist, well rugated, scant white physiologic discharge. No blood in vault. Cervix pink, non-friable, without lesion. No CMT. Uterus non tender, mobile, not enlarged. Adnexa without fullness or tenderness.    Skin:     General: Skin is warm.   Neurological:      Mental Status: She is alert and oriented to person, place, and time.   Psychiatric:         Behavior: Behavior normal.         Thought Content: Thought content normal.         Judgment: Judgment  normal.           ASSESSMENT:       ICD-10-CM ICD-9-CM    1. Well woman exam  Z01.419 V72.31 Liquid-Based Pap Smear, Screening          No orders of the defined types were placed in this encounter.          Plan:      - Pap today   - tobacco cessation n/a  - contraception OCPs  - HPV vaccine s/p   - Safe Sex discussed    - Counseled to take daily multivitamin. If patient is of reproductive age and not on contraception, to take prenatal vitamin. Patient has been counseled on the vitamin D and calcium requirements per ACOG recommendations.  Age   Calcium(mg/day)   Vitamin D (IU/day)  9-18    1300                       600  19-50  1000                       600  51-70  1200                       600  >70      1,200                      800  Patient to start vitamin   - Covid vaccine n/a  - Discussed IPV, feels safe   Rani Jean Baptiste M.D.  Obstetrics and Gynecology

## 2025-06-04 ENCOUNTER — RESULTS FOLLOW-UP (OUTPATIENT)
Dept: OBSTETRICS AND GYNECOLOGY | Facility: CLINIC | Age: 30
End: 2025-06-04